# Patient Record
Sex: FEMALE | Race: WHITE
[De-identification: names, ages, dates, MRNs, and addresses within clinical notes are randomized per-mention and may not be internally consistent; named-entity substitution may affect disease eponyms.]

---

## 2017-10-02 ENCOUNTER — HOSPITAL ENCOUNTER (OUTPATIENT)
Dept: HOSPITAL 53 - M WUC | Age: 42
End: 2017-10-02
Attending: PHYSICIAN ASSISTANT
Payer: COMMERCIAL

## 2017-10-02 DIAGNOSIS — R51: Primary | ICD-10-CM

## 2017-10-02 LAB
ADD MANUAL DIFFER: YES
ALBUMIN SERPL BCG-MCNC: 4.3 GM/DL (ref 3.2–5.2)
ALBUMIN/GLOB SERPL: 1.13 {RATIO} (ref 1–1.93)
ALP SERPL-CCNC: 115 U/L (ref 45–117)
ALT SERPL W P-5'-P-CCNC: 15 U/L (ref 12–78)
ANION GAP SERPL CALC-SCNC: 8 MEQ/L (ref 8–16)
ANISOCYTOSIS BLD QL SMEAR: (no result)
AST SERPL-CCNC: 9 U/L (ref 15–37)
BASOPHILS NFR BLD MANUAL: 1 % (ref 0–4)
BILIRUB SERPL-MCNC: 0.9 MG/DL (ref 0.2–1)
BUN SERPL-MCNC: 13 MG/DL (ref 7–18)
CALCIUM SERPL-MCNC: 9.5 MG/DL (ref 8.5–10.1)
CHLORIDE SERPL-SCNC: 105 MEQ/L (ref 98–107)
CO2 SERPL-SCNC: 28 MEQ/L (ref 21–32)
CREAT SERPL-MCNC: 0.6 MG/DL (ref 0.55–1.02)
DIFF SLIDE NUMBER: 254
ERYTHROCYTE [DISTWIDTH] IN BLOOD BY AUTOMATED COUNT: 17.1 % (ref 11.5–14.5)
ERYTHROCYTE [SEDIMENTATION RATE] IN BLOOD BY WESTERGREN METHOD: 12 MM/HR (ref 0–20)
GFR SERPL CREATININE-BSD FRML MDRD: > 60 ML/MIN/{1.73_M2} (ref 58–?)
GLUCOSE SERPL-MCNC: 92 MG/DL (ref 70–105)
MCH RBC QN AUTO: 28.2 PG (ref 27–33)
MCHC RBC AUTO-ENTMCNC: 32.1 G/DL (ref 32–36.5)
MCV RBC AUTO: 87.8 FL (ref 80–96)
NRBC BLD AUTO-RTO: 0 % (ref 0–0)
PLATELET # BLD AUTO: 290 10^3/UL (ref 150–450)
POTASSIUM SERPL-SCNC: 4.1 MEQ/L (ref 3.5–5.1)
PROT SERPL-MCNC: 8.1 GM/DL (ref 6.4–8.2)
SODIUM SERPL-SCNC: 141 MEQ/L (ref 136–145)
WBC # BLD AUTO: 12 10^3/UL (ref 4–10)

## 2017-10-03 ENCOUNTER — HOSPITAL ENCOUNTER (INPATIENT)
Dept: HOSPITAL 53 - M ED | Age: 42
LOS: 10 days | Discharge: HOME | DRG: 21 | End: 2017-10-13
Attending: NEUROLOGICAL SURGERY | Admitting: INTERNAL MEDICINE
Payer: COMMERCIAL

## 2017-10-03 VITALS — SYSTOLIC BLOOD PRESSURE: 136 MMHG | DIASTOLIC BLOOD PRESSURE: 65 MMHG

## 2017-10-03 VITALS — SYSTOLIC BLOOD PRESSURE: 132 MMHG | DIASTOLIC BLOOD PRESSURE: 67 MMHG

## 2017-10-03 VITALS — DIASTOLIC BLOOD PRESSURE: 66 MMHG | SYSTOLIC BLOOD PRESSURE: 133 MMHG

## 2017-10-03 VITALS — SYSTOLIC BLOOD PRESSURE: 141 MMHG | DIASTOLIC BLOOD PRESSURE: 68 MMHG

## 2017-10-03 VITALS — WEIGHT: 139.99 LBS | BODY MASS INDEX: 22.5 KG/M2 | HEIGHT: 66 IN

## 2017-10-03 VITALS — DIASTOLIC BLOOD PRESSURE: 71 MMHG | SYSTOLIC BLOOD PRESSURE: 133 MMHG

## 2017-10-03 VITALS — DIASTOLIC BLOOD PRESSURE: 70 MMHG | SYSTOLIC BLOOD PRESSURE: 146 MMHG

## 2017-10-03 DIAGNOSIS — I16.9: ICD-10-CM

## 2017-10-03 DIAGNOSIS — G04.90: ICD-10-CM

## 2017-10-03 DIAGNOSIS — F17.210: ICD-10-CM

## 2017-10-03 DIAGNOSIS — I61.5: Primary | ICD-10-CM

## 2017-10-03 DIAGNOSIS — G91.3: ICD-10-CM

## 2017-10-03 LAB
ADD MANUAL DIFFER: NO
ALBUMIN SERPL BCG-MCNC: 4.2 GM/DL (ref 3.2–5.2)
ALBUMIN/GLOB SERPL: 1.14 {RATIO} (ref 1–1.93)
ALP SERPL-CCNC: 104 U/L (ref 45–117)
ALT SERPL W P-5'-P-CCNC: 12 U/L (ref 12–78)
ANION GAP SERPL CALC-SCNC: 7 MEQ/L (ref 8–16)
AST SERPL-CCNC: 8 U/L (ref 15–37)
BASOPHILS # BLD AUTO: 0.1 10^3/UL (ref 0–0.2)
BASOPHILS NFR BLD AUTO: 0.6 % (ref 0–1)
BILIRUB CONJ SERPL-MCNC: 0.2 MG/DL (ref 0–0.2)
BILIRUB SERPL-MCNC: 0.7 MG/DL (ref 0.2–1)
BUN SERPL-MCNC: 20 MG/DL (ref 7–18)
CALCIUM SERPL-MCNC: 9.3 MG/DL (ref 8.5–10.1)
CHLORIDE SERPL-SCNC: 105 MEQ/L (ref 98–107)
CO2 SERPL-SCNC: 28 MEQ/L (ref 21–32)
CREAT SERPL-MCNC: 0.62 MG/DL (ref 0.55–1.02)
DIFF SLIDE NUMBER: 249
EOSINOPHIL # BLD AUTO: 0.1 10^3/UL (ref 0–0.5)
EOSINOPHIL NFR BLD AUTO: 0.6 % (ref 0–3)
ERYTHROCYTE [DISTWIDTH] IN BLOOD BY AUTOMATED COUNT: 17.2 % (ref 11.5–14.5)
GFR SERPL CREATININE-BSD FRML MDRD: > 60 ML/MIN/{1.73_M2} (ref 58–?)
GLUCOSE SERPL-MCNC: 85 MG/DL (ref 70–105)
IMM GRANULOCYTES NFR BLD: 0.4 % (ref 0–0)
INR PPP: 1.04
LYMPHOCYTES # BLD AUTO: 1.5 10^3/UL (ref 1.5–4.5)
LYMPHOCYTES NFR BLD AUTO: 13.9 % (ref 24–44)
MCH RBC QN AUTO: 27.9 PG (ref 27–33)
MCHC RBC AUTO-ENTMCNC: 31.6 G/DL (ref 32–36.5)
MCV RBC AUTO: 88.4 FL (ref 80–96)
MONOCYTES # BLD AUTO: 0.6 10^3/UL (ref 0–0.8)
MONOCYTES NFR BLD AUTO: 5.2 % (ref 0–5)
NEUTROPHILS # BLD AUTO: 8.6 10^3/UL (ref 1.8–7.7)
NEUTROPHILS NFR BLD AUTO: 79.3 % (ref 36–66)
NRBC BLD AUTO-RTO: 0 % (ref 0–0)
PLATELET # BLD AUTO: 282 10^3/UL (ref 150–450)
POTASSIUM SERPL-SCNC: 3.6 MEQ/L (ref 3.5–5.1)
PROT SERPL-MCNC: 7.9 GM/DL (ref 6.4–8.2)
SODIUM SERPL-SCNC: 140 MEQ/L (ref 136–145)
WBC # BLD AUTO: 10.9 10^3/UL (ref 4–10)

## 2017-10-03 RX ADMIN — HYDROCODONE BITARTRATE AND ACETAMINOPHEN PRN TAB: 5; 325 TABLET ORAL at 17:18

## 2017-10-03 RX ADMIN — MORPHINE SULFATE PRN MG: 2 INJECTION, SOLUTION INTRAMUSCULAR; INTRAVENOUS at 22:25

## 2017-10-03 RX ADMIN — MORPHINE SULFATE PRN MG: 4 INJECTION INTRAVENOUS at 15:18

## 2017-10-03 RX ADMIN — NICARDIPINE HYDROCHLORIDE SCH MLS/HR: 0.2 INJECTION, SOLUTION INTRAVENOUS at 17:35

## 2017-10-03 RX ADMIN — MORPHINE SULFATE PRN MG: 4 INJECTION INTRAVENOUS at 16:23

## 2017-10-03 RX ADMIN — MORPHINE SULFATE PRN MG: 4 INJECTION INTRAVENOUS at 14:07

## 2017-10-03 RX ADMIN — ONDANSETRON PRN MG: 2 INJECTION INTRAMUSCULAR; INTRAVENOUS at 22:18

## 2017-10-03 RX ADMIN — MORPHINE SULFATE PRN MG: 4 INJECTION INTRAVENOUS at 19:19

## 2017-10-03 NOTE — REP
CT of the brain without IV contrast:

 

There are no comparisons.

 

There is an acute intraparenchymal hemorrhage in the right basal ganglia.  This

has dissected into the intracranial ventricles and hemorrhage  and be seen in the

lateral ventricles and third and fourth ventricles.

 

The cortical stripe is unremarkable.  There is no  mass effect or midline shift.

 

The visualized paranasal sinuses and mastoid air cells are clear.

 

Impression:

 

Acute intraparenchymal right basal ganglia hemorrhage that has dissected into the

intracranial ventricles.

 

 

Signed by

Herb Monsivais MD 10/03/2017 01:20 P

## 2017-10-03 NOTE — REP
CT ANGIO HEAD:

 

HISTORY:  Intracranial hemorrhage.

 

CONTRAST: Isovue 370, 75 mL.

 

There is no aneurysm, arteriovenous malformation, or atherosclerotic lesion.

Major intracranial vessels are patent.  The vertebral arteries are equal in size.

 

 

IMPRESSION:

 

Normal CT ANGIO head.

 

 

Signed by

Franc Way MD 10/03/2017 03:11 P

## 2017-10-03 NOTE — HPE
DATE OF ADMISSION:  10/03/2017

 

I was asked to emergently evaluate Ms. Damon for intracranial hemorrhage with

associated hypertensive crisis.

 

HISTORY OF PRESENT ILLNESS:   Ms. Damon is a 41-year-old white female with no

significant past medical history who developed a headache on Sunday morning.  
The

headache has continued to progress.  Starting Sunday afternoon, she had nausea

and emesis and this has also persisted.  She was seen at urgent care yesterday

and was hypertensive with a blood pressure in the 180s over the 100s.  I do not

know what treatment was she was given at that time.  When her symptoms persisted
,

she proceeded to the emergency department today where she was again found to be

hypertensive with a presenting blood pressure of 194/106 with a MAP of 135, 
which

was also her maximum MAP.  Her evaluation included head CT, which showed an

intracranial hemorrhage.  The CT angiogram was normal.  Ms. Damon was started 
on

Cardizem drip and her blood pressure has decreased into an acceptable range.

 

Ms. Damon reports no history of hypertension.  While she has not been seen a

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

frequently take each other's blood pressures and she reports her blood pressure

has always been low.  She continues to have a headache and notes shooting pain

when she moves her head.  She notes photophobia since Sunday but not previously.

No visual changes. No shortness of breath or chest pain.  No palpitations.  She

has felt hot and cold and has had non-drenching sweats, which is new for her.  
No

fevers.  No ill contacts.  As noted previously, she has nausea and emesis, which

started on Sunday.  No wheezing.  No lower extremity edema.  No history of deep

vein thrombosis (DVT).  No trauma.

 

She is not on any medications at baseline.

 

PAST MEDICAL HISTORY:  Tobacco usage, ongoing.

 

MEDICATIONS:  None.

 

ALLERGIES:  No known drug allergies.

 

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  She has not been out of the

area recently and is currently working in [employer].  She has a new puppy that is a

pit bull/[place] bulldog mix and a rabbit as pets.  She is a smoker, having

started at age 17 and averages one half to one pack per day.  She may have two

alcoholic beverages per week.  No recreational drug usage.

 

FAMILY HISTORY:   There is a family history on both sides of hypertension.

 

REVIEW OF SYSTEMS:  As per history of present illness.  The remainder of the

pertinent review of systems are negative.

 

PHYSICAL EXAMINATION:

GENERAL:  Ms. Damon is lying in bed flat on her back in a dark room and appears

reasonably comfortable.

VITAL SIGNS:  Temperature 98.2, pulse respiratory rate 18, blood pressure 124/56

with a MAP of 78, SpO2 of 96 %on room air.  Pulse 88.

HEENT:  Anicteric.  Pupils are 1 to 2 mm and reactive, anicteric.  Nares:  
Patent

bilaterally with moist mucosa.  Oropharynx clear with no lesions.  No evidence 
of

drainage in the posterior oropharynx.  Moist mucosa. Good dentition.  Without

thyromegaly or masses.  Trachea is midline.

LYMPHS:  Without cervical or supraclavicular lymphadenopathy.

LUNGS:  Symmetric excursion.  Good air entry. No wheeze, rhonchi or crackles on

tidal excursion.  Normal I:E.  No accessory muscle usage or retractions.  Normal

percussion and palpation.

CARDIOVASCULAR:  Regular rate and rhythm with normal S1, S2.  No murmur, rub or

gallop appreciated.

ABDOMEN:  Normoactive bowel sounds. Soft, nondistended, nontender.  No

hepatosplenomegaly or masses appreciated.

EXTREMITIES:  Without clubbing, cyanosis, or edema.

 

LABORATORY DATA:

Complete blood count (CBC) today showed a hemoglobin of 12, hematocrit 38,

platelet count 282,000.  White blood cell count 10,900 with a differential of 79
%

neutrophils, 14% lymphocytes, 5% monocytes.

 

Chemistries show a sodium of 140, potassium 3.6, chloride 105, bicarbonate 28,

anion gap 7, BUN 20, creatinine 0.6, glucose 85, calcium 9.3, total bilirubin

0.7, direct bilirubin 0.2, AST 8, ALT 12, alkaline phosphatase 104, CK 56, CK-MB

1.0, troponin I less than 0.02, total protein 7.9, albumin 4.2.  INR 1.04 and 
PTT

23.5.  I reviewed her head CT scan, as well as the report from earlier today.

That study showed acute intraparenchymal basal ganglia hemorrhage that has

dissected into the intracranial ventricles, per the report, and I agree with

the findings in the report.  Her CT angio of the head was read was normal.

 

IMPRESSION:

1.  Intracranial hemorrhage, likely secondary to hypertension.

2.  Hypertensive crisis.

3.  Tobacco usage, ongoing.

 

RECOMMENDATIONS:

1.  We will continue the Cardizem drip.

2.  Start Norvasc.

3.  While she does not have the classic triad of a pheochromocytoma, we will do 
at least a

screening 24 hour urine collection for catecholamines and metanephrines.

4.  We will consult neurosurgery.

5.  Anticipate at least 2 days of monitoring in the intensive care unit (ICU) to

make sure that there is no evidence of hydrocephalus.

 

Critical care time 50 minutes, not including procedures.

RUMA

## 2017-10-04 VITALS — SYSTOLIC BLOOD PRESSURE: 138 MMHG | DIASTOLIC BLOOD PRESSURE: 66 MMHG

## 2017-10-04 VITALS — DIASTOLIC BLOOD PRESSURE: 58 MMHG | SYSTOLIC BLOOD PRESSURE: 122 MMHG

## 2017-10-04 VITALS — DIASTOLIC BLOOD PRESSURE: 64 MMHG | SYSTOLIC BLOOD PRESSURE: 152 MMHG

## 2017-10-04 VITALS — SYSTOLIC BLOOD PRESSURE: 136 MMHG | DIASTOLIC BLOOD PRESSURE: 64 MMHG

## 2017-10-04 VITALS — DIASTOLIC BLOOD PRESSURE: 67 MMHG | SYSTOLIC BLOOD PRESSURE: 144 MMHG

## 2017-10-04 VITALS — SYSTOLIC BLOOD PRESSURE: 125 MMHG | DIASTOLIC BLOOD PRESSURE: 66 MMHG

## 2017-10-04 VITALS — DIASTOLIC BLOOD PRESSURE: 74 MMHG | SYSTOLIC BLOOD PRESSURE: 152 MMHG

## 2017-10-04 VITALS — SYSTOLIC BLOOD PRESSURE: 139 MMHG | DIASTOLIC BLOOD PRESSURE: 63 MMHG

## 2017-10-04 VITALS — SYSTOLIC BLOOD PRESSURE: 128 MMHG | DIASTOLIC BLOOD PRESSURE: 62 MMHG

## 2017-10-04 VITALS — SYSTOLIC BLOOD PRESSURE: 141 MMHG | DIASTOLIC BLOOD PRESSURE: 63 MMHG

## 2017-10-04 VITALS — SYSTOLIC BLOOD PRESSURE: 118 MMHG | DIASTOLIC BLOOD PRESSURE: 57 MMHG

## 2017-10-04 VITALS — SYSTOLIC BLOOD PRESSURE: 128 MMHG | DIASTOLIC BLOOD PRESSURE: 65 MMHG

## 2017-10-04 VITALS — SYSTOLIC BLOOD PRESSURE: 134 MMHG | DIASTOLIC BLOOD PRESSURE: 63 MMHG

## 2017-10-04 VITALS — DIASTOLIC BLOOD PRESSURE: 57 MMHG | SYSTOLIC BLOOD PRESSURE: 136 MMHG

## 2017-10-04 VITALS — SYSTOLIC BLOOD PRESSURE: 157 MMHG | DIASTOLIC BLOOD PRESSURE: 81 MMHG

## 2017-10-04 VITALS — SYSTOLIC BLOOD PRESSURE: 151 MMHG | DIASTOLIC BLOOD PRESSURE: 80 MMHG

## 2017-10-04 VITALS — SYSTOLIC BLOOD PRESSURE: 139 MMHG | DIASTOLIC BLOOD PRESSURE: 68 MMHG

## 2017-10-04 VITALS — DIASTOLIC BLOOD PRESSURE: 59 MMHG | SYSTOLIC BLOOD PRESSURE: 137 MMHG

## 2017-10-04 VITALS — DIASTOLIC BLOOD PRESSURE: 87 MMHG | SYSTOLIC BLOOD PRESSURE: 134 MMHG

## 2017-10-04 VITALS — SYSTOLIC BLOOD PRESSURE: 139 MMHG | DIASTOLIC BLOOD PRESSURE: 66 MMHG

## 2017-10-04 VITALS — DIASTOLIC BLOOD PRESSURE: 58 MMHG | SYSTOLIC BLOOD PRESSURE: 130 MMHG

## 2017-10-04 VITALS — DIASTOLIC BLOOD PRESSURE: 65 MMHG | SYSTOLIC BLOOD PRESSURE: 137 MMHG

## 2017-10-04 VITALS — DIASTOLIC BLOOD PRESSURE: 63 MMHG | SYSTOLIC BLOOD PRESSURE: 130 MMHG

## 2017-10-04 VITALS — DIASTOLIC BLOOD PRESSURE: 73 MMHG | SYSTOLIC BLOOD PRESSURE: 134 MMHG

## 2017-10-04 LAB
ALBUMIN SERPL BCG-MCNC: 3.7 GM/DL (ref 3.2–5.2)
ALBUMIN/GLOB SERPL: 1.06 {RATIO} (ref 1–1.93)
ALP SERPL-CCNC: 90 U/L (ref 45–117)
ALT SERPL W P-5'-P-CCNC: 12 U/L (ref 12–78)
ANION GAP SERPL CALC-SCNC: 11 MEQ/L (ref 8–16)
AST SERPL-CCNC: 7 U/L (ref 15–37)
BILIRUB SERPL-MCNC: 0.7 MG/DL (ref 0.2–1)
BUN SERPL-MCNC: 13 MG/DL (ref 7–18)
CALCIUM SERPL-MCNC: 8.6 MG/DL (ref 8.5–10.1)
CHLORIDE SERPL-SCNC: 108 MEQ/L (ref 98–107)
CO2 SERPL-SCNC: 22 MEQ/L (ref 21–32)
CREAT SERPL-MCNC: 0.41 MG/DL (ref 0.55–1.02)
ERYTHROCYTE [DISTWIDTH] IN BLOOD BY AUTOMATED COUNT: 17.1 % (ref 11.5–14.5)
GFR SERPL CREATININE-BSD FRML MDRD: > 60 ML/MIN/{1.73_M2} (ref 58–?)
GLUCOSE SERPL-MCNC: 95 MG/DL (ref 70–105)
MCH RBC QN AUTO: 27.4 PG (ref 27–33)
MCHC RBC AUTO-ENTMCNC: 31.1 G/DL (ref 32–36.5)
MCV RBC AUTO: 88.1 FL (ref 80–96)
PLATELET # BLD AUTO: 258 10^3/UL (ref 150–450)
POTASSIUM SERPL-SCNC: 3.3 MEQ/L (ref 3.5–5.1)
PROT SERPL-MCNC: 7.2 GM/DL (ref 6.4–8.2)
SODIUM SERPL-SCNC: 141 MEQ/L (ref 136–145)
WBC # BLD AUTO: 11.1 10^3/UL (ref 4–10)

## 2017-10-04 RX ADMIN — MORPHINE SULFATE PRN MG: 2 INJECTION, SOLUTION INTRAMUSCULAR; INTRAVENOUS at 15:52

## 2017-10-04 RX ADMIN — NICARDIPINE HYDROCHLORIDE SCH MLS/HR: 0.2 INJECTION, SOLUTION INTRAVENOUS at 15:34

## 2017-10-04 RX ADMIN — MORPHINE SULFATE PRN MG: 2 INJECTION, SOLUTION INTRAMUSCULAR; INTRAVENOUS at 21:45

## 2017-10-04 RX ADMIN — MORPHINE SULFATE PRN MG: 2 INJECTION, SOLUTION INTRAMUSCULAR; INTRAVENOUS at 04:36

## 2017-10-04 RX ADMIN — HYDROCODONE BITARTRATE AND ACETAMINOPHEN PRN TAB: 5; 325 TABLET ORAL at 11:59

## 2017-10-04 RX ADMIN — MORPHINE SULFATE PRN MG: 2 INJECTION, SOLUTION INTRAMUSCULAR; INTRAVENOUS at 11:06

## 2017-10-04 RX ADMIN — METOCLOPRAMIDE PRN MG: 5 INJECTION, SOLUTION INTRAMUSCULAR; INTRAVENOUS at 17:46

## 2017-10-04 RX ADMIN — DOCUSATE SODIUM SCH MG: 100 CAPSULE, LIQUID FILLED ORAL at 20:03

## 2017-10-04 RX ADMIN — MORPHINE SULFATE PRN MG: 2 INJECTION, SOLUTION INTRAMUSCULAR; INTRAVENOUS at 17:54

## 2017-10-04 RX ADMIN — HYDROCODONE BITARTRATE AND ACETAMINOPHEN PRN TAB: 5; 325 TABLET ORAL at 07:58

## 2017-10-04 RX ADMIN — POTASSIUM CHLORIDE AND SODIUM CHLORIDE SCH MLS/HR: 900; 150 INJECTION, SOLUTION INTRAVENOUS at 23:35

## 2017-10-04 RX ADMIN — ONDANSETRON PRN MG: 2 INJECTION INTRAMUSCULAR; INTRAVENOUS at 02:35

## 2017-10-04 RX ADMIN — MORPHINE SULFATE PRN MG: 2 INJECTION, SOLUTION INTRAMUSCULAR; INTRAVENOUS at 02:35

## 2017-10-04 RX ADMIN — ONDANSETRON PRN MG: 2 INJECTION INTRAMUSCULAR; INTRAVENOUS at 19:45

## 2017-10-04 RX ADMIN — MORPHINE SULFATE PRN MG: 2 INJECTION, SOLUTION INTRAMUSCULAR; INTRAVENOUS at 06:36

## 2017-10-04 RX ADMIN — MORPHINE SULFATE PRN MG: 2 INJECTION, SOLUTION INTRAMUSCULAR; INTRAVENOUS at 00:28

## 2017-10-04 RX ADMIN — NICARDIPINE HYDROCHLORIDE SCH MLS/HR: 0.2 INJECTION, SOLUTION INTRAVENOUS at 08:00

## 2017-10-04 RX ADMIN — MORPHINE SULFATE PRN MG: 2 INJECTION, SOLUTION INTRAMUSCULAR; INTRAVENOUS at 19:46

## 2017-10-04 RX ADMIN — MORPHINE SULFATE PRN MG: 2 INJECTION, SOLUTION INTRAMUSCULAR; INTRAVENOUS at 13:50

## 2017-10-04 RX ADMIN — METOCLOPRAMIDE PRN MG: 5 INJECTION, SOLUTION INTRAMUSCULAR; INTRAVENOUS at 10:56

## 2017-10-04 RX ADMIN — NICARDIPINE HYDROCHLORIDE SCH MLS/HR: 0.2 INJECTION, SOLUTION INTRAVENOUS at 22:26

## 2017-10-04 RX ADMIN — ONDANSETRON PRN MG: 2 INJECTION INTRAMUSCULAR; INTRAVENOUS at 13:50

## 2017-10-04 RX ADMIN — MORPHINE SULFATE PRN MG: 2 INJECTION, SOLUTION INTRAMUSCULAR; INTRAVENOUS at 08:34

## 2017-10-04 RX ADMIN — ACETAMINOPHEN PRN MG: 325 TABLET ORAL at 04:33

## 2017-10-04 RX ADMIN — DEXTROSE MONOHYDRATE SCH MG: 50 INJECTION, SOLUTION INTRAVENOUS at 12:00

## 2017-10-04 RX ADMIN — NICARDIPINE HYDROCHLORIDE SCH MLS/HR: 0.2 INJECTION, SOLUTION INTRAVENOUS at 19:50

## 2017-10-04 RX ADMIN — DOCUSATE SODIUM SCH MG: 100 CAPSULE, LIQUID FILLED ORAL at 10:44

## 2017-10-04 RX ADMIN — ONDANSETRON PRN MG: 2 INJECTION INTRAMUSCULAR; INTRAVENOUS at 06:35

## 2017-10-04 RX ADMIN — POTASSIUM CHLORIDE AND SODIUM CHLORIDE SCH MLS/HR: 900; 150 INJECTION, SOLUTION INTRAVENOUS at 10:44

## 2017-10-04 RX ADMIN — NICARDIPINE HYDROCHLORIDE SCH MLS/HR: 0.2 INJECTION, SOLUTION INTRAVENOUS at 00:29

## 2017-10-04 NOTE — REP
CT Head without contrast

 

HISTORY:  Intracranial hemorrhage

 

COMPARISON: 10/03/2017

 

A small acute intraparenchymal hematoma is present in the right basal ganglia.

This is unchanged compared to the previous study. A small amount of

intraventricular hemorrhage is present unchanged compared to the previous study.

The ventricles are slightly increased in size compared to the previous study.

There is no acute infarct,  mass or midline shift. There is no extra cerebral

collection.  There is no fracture.  The visualized sinuses are clear.

 

IMPRESSION:

1.  Small acute right basal ganglia intraparenchymal hematoma unchanged compared

to the previous study.

2.  Small amount of intraventricular hemorrhage unchanged compared to the

previous study. There has been a slight increase in size of the ventricles.

 

 

 

Signed by

Franc Way MD 10/04/2017 09:01 A

## 2017-10-04 NOTE — ECGEPIP
Stationary ECG Study

                           [facility] - ED

                                       

                                       Test Date:    2017-10-03

Pat Name:     NEELA NARVAEZ         Department:   

Patient ID:   [id number]                 Room:         -

Gender:       F                        Technician:   cindy

:          1975               Requested By: JESSICA HENDERSON PA-C

Order Number: [id number]     Reading MD:   Endy Campa

                                 Measurements

Intervals                              Axis          

Rate:         122                      P:            15

ME:           133                      QRS:          56

QRSD:         85                       T:            36

QT:           209                                    

QTc:          298                                    

                           Interpretive Statements

SINUS TACHYCARDIA WITH FREQUENT VENTRICULAR PREMATURE COMPLEXES

NONSPECIFIC T-WAVE ABNORMALITY

BENIGN EARLY REPOLARIZATION

NO PRIORS

 

Electronically Signed On 10-4-2017 6:21:38 EDT by Endy Campa

## 2017-10-05 VITALS — SYSTOLIC BLOOD PRESSURE: 132 MMHG | DIASTOLIC BLOOD PRESSURE: 68 MMHG

## 2017-10-05 VITALS — DIASTOLIC BLOOD PRESSURE: 74 MMHG | SYSTOLIC BLOOD PRESSURE: 145 MMHG

## 2017-10-05 VITALS — SYSTOLIC BLOOD PRESSURE: 131 MMHG | DIASTOLIC BLOOD PRESSURE: 60 MMHG

## 2017-10-05 VITALS — SYSTOLIC BLOOD PRESSURE: 119 MMHG | DIASTOLIC BLOOD PRESSURE: 58 MMHG

## 2017-10-05 VITALS — SYSTOLIC BLOOD PRESSURE: 140 MMHG | DIASTOLIC BLOOD PRESSURE: 70 MMHG | HEART RATE: 90 BPM

## 2017-10-05 VITALS — DIASTOLIC BLOOD PRESSURE: 65 MMHG | SYSTOLIC BLOOD PRESSURE: 142 MMHG

## 2017-10-05 VITALS — DIASTOLIC BLOOD PRESSURE: 74 MMHG | SYSTOLIC BLOOD PRESSURE: 141 MMHG

## 2017-10-05 VITALS — SYSTOLIC BLOOD PRESSURE: 131 MMHG | DIASTOLIC BLOOD PRESSURE: 72 MMHG

## 2017-10-05 VITALS — DIASTOLIC BLOOD PRESSURE: 86 MMHG | SYSTOLIC BLOOD PRESSURE: 146 MMHG

## 2017-10-05 VITALS — DIASTOLIC BLOOD PRESSURE: 80 MMHG | SYSTOLIC BLOOD PRESSURE: 149 MMHG

## 2017-10-05 VITALS — SYSTOLIC BLOOD PRESSURE: 137 MMHG | DIASTOLIC BLOOD PRESSURE: 64 MMHG

## 2017-10-05 VITALS — DIASTOLIC BLOOD PRESSURE: 54 MMHG | SYSTOLIC BLOOD PRESSURE: 117 MMHG

## 2017-10-05 VITALS — SYSTOLIC BLOOD PRESSURE: 148 MMHG | DIASTOLIC BLOOD PRESSURE: 75 MMHG

## 2017-10-05 VITALS — DIASTOLIC BLOOD PRESSURE: 71 MMHG | SYSTOLIC BLOOD PRESSURE: 137 MMHG

## 2017-10-05 VITALS — DIASTOLIC BLOOD PRESSURE: 78 MMHG | SYSTOLIC BLOOD PRESSURE: 154 MMHG

## 2017-10-05 VITALS — SYSTOLIC BLOOD PRESSURE: 121 MMHG | DIASTOLIC BLOOD PRESSURE: 54 MMHG

## 2017-10-05 LAB
ALBUMIN SERPL BCG-MCNC: 3.5 GM/DL (ref 3.2–5.2)
ALBUMIN/GLOB SERPL: 1.06 {RATIO} (ref 1–1.93)
ALP SERPL-CCNC: 81 U/L (ref 45–117)
ALT SERPL W P-5'-P-CCNC: 11 U/L (ref 12–78)
ANION GAP SERPL CALC-SCNC: 12 MEQ/L (ref 8–16)
APPEARANCE CSF: (no result)
AST SERPL-CCNC: 6 U/L (ref 15–37)
BILIRUB SERPL-MCNC: 0.8 MG/DL (ref 0.2–1)
BUN SERPL-MCNC: 8 MG/DL (ref 7–18)
CALCIUM SERPL-MCNC: 8.5 MG/DL (ref 8.5–10.1)
CHLORIDE SERPL-SCNC: 109 MEQ/L (ref 98–107)
CO2 SERPL-SCNC: 21 MEQ/L (ref 21–32)
COLOR CSF: (no result)
CREAT SERPL-MCNC: 0.47 MG/DL (ref 0.55–1.02)
CSF DIFF IF INDICATED?: YES
CSF MONONUCLEAR CELL %: 58.6 % (ref 0–0)
CSF POLYMORPHONUCLEAR CELL %: 41.4 (ref 0–0)
ERYTHROCYTE [DISTWIDTH] IN BLOOD BY AUTOMATED COUNT: 17.1 % (ref 11.5–14.5)
GFR SERPL CREATININE-BSD FRML MDRD: > 60 ML/MIN/{1.73_M2} (ref 58–?)
GLUCOSE CSF-MCNC: 80 MG/DL (ref 40–75)
GLUCOSE SERPL-MCNC: 97 MG/DL (ref 70–105)
MCH RBC QN AUTO: 28.3 PG (ref 27–33)
MCHC RBC AUTO-ENTMCNC: 32.3 G/DL (ref 32–36.5)
MCV RBC AUTO: 87.6 FL (ref 80–96)
PLATELET # BLD AUTO: 246 10^3/UL (ref 150–450)
POTASSIUM SERPL-SCNC: 3.4 MEQ/L (ref 3.5–5.1)
PROT SERPL-MCNC: 6.8 GM/DL (ref 6.4–8.2)
SODIUM SERPL-SCNC: 142 MEQ/L (ref 136–145)
TUBE # CSF: (no result)
WBC # BLD AUTO: 10.8 10^3/UL (ref 4–10)

## 2017-10-05 PROCEDURE — 009600Z DRAINAGE OF CEREBRAL VENTRICLE WITH DRAINAGE DEVICE, OPEN APPROACH: ICD-10-PCS | Performed by: NEUROLOGICAL SURGERY

## 2017-10-05 RX ADMIN — DEXTROSE MONOHYDRATE SCH MG: 50 INJECTION, SOLUTION INTRAVENOUS at 08:41

## 2017-10-05 RX ADMIN — NICARDIPINE HYDROCHLORIDE SCH MLS/HR: 0.2 INJECTION, SOLUTION INTRAVENOUS at 12:16

## 2017-10-05 RX ADMIN — MORPHINE SULFATE PRN MG: 2 INJECTION, SOLUTION INTRAMUSCULAR; INTRAVENOUS at 04:46

## 2017-10-05 RX ADMIN — MORPHINE SULFATE PRN MG: 2 INJECTION, SOLUTION INTRAMUSCULAR; INTRAVENOUS at 15:57

## 2017-10-05 RX ADMIN — NICARDIPINE HYDROCHLORIDE SCH MLS/HR: 0.2 INJECTION, SOLUTION INTRAVENOUS at 18:10

## 2017-10-05 RX ADMIN — POTASSIUM CHLORIDE AND SODIUM CHLORIDE SCH MLS/HR: 900; 150 INJECTION, SOLUTION INTRAVENOUS at 20:42

## 2017-10-05 RX ADMIN — HYDROCODONE BITARTRATE AND ACETAMINOPHEN PRN TAB: 5; 325 TABLET ORAL at 20:42

## 2017-10-05 RX ADMIN — HYDROCODONE BITARTRATE AND ACETAMINOPHEN PRN TAB: 5; 325 TABLET ORAL at 16:42

## 2017-10-05 RX ADMIN — SODIUM CHLORIDE, PRESERVATIVE FREE SCH ML: 5 INJECTION INTRAVENOUS at 22:00

## 2017-10-05 RX ADMIN — MORPHINE SULFATE PRN MG: 2 INJECTION, SOLUTION INTRAMUSCULAR; INTRAVENOUS at 18:12

## 2017-10-05 RX ADMIN — ONDANSETRON PRN MG: 2 INJECTION INTRAMUSCULAR; INTRAVENOUS at 07:36

## 2017-10-05 RX ADMIN — MORPHINE SULFATE PRN MG: 2 INJECTION, SOLUTION INTRAMUSCULAR; INTRAVENOUS at 11:08

## 2017-10-05 RX ADMIN — MORPHINE SULFATE PRN MG: 2 INJECTION, SOLUTION INTRAMUSCULAR; INTRAVENOUS at 06:33

## 2017-10-05 RX ADMIN — MORPHINE SULFATE PRN MG: 2 INJECTION, SOLUTION INTRAMUSCULAR; INTRAVENOUS at 13:43

## 2017-10-05 RX ADMIN — METOCLOPRAMIDE PRN MG: 5 INJECTION, SOLUTION INTRAMUSCULAR; INTRAVENOUS at 11:07

## 2017-10-05 RX ADMIN — ONDANSETRON PRN MG: 2 INJECTION INTRAMUSCULAR; INTRAVENOUS at 21:09

## 2017-10-05 RX ADMIN — MORPHINE SULFATE PRN MG: 2 INJECTION, SOLUTION INTRAMUSCULAR; INTRAVENOUS at 21:10

## 2017-10-05 RX ADMIN — ONDANSETRON PRN MG: 2 INJECTION INTRAMUSCULAR; INTRAVENOUS at 15:57

## 2017-10-05 RX ADMIN — MORPHINE SULFATE PRN MG: 2 INJECTION, SOLUTION INTRAMUSCULAR; INTRAVENOUS at 08:55

## 2017-10-05 RX ADMIN — POTASSIUM CHLORIDE SCH MLS/HR: 7.46 INJECTION, SOLUTION INTRAVENOUS at 11:10

## 2017-10-05 RX ADMIN — POTASSIUM CHLORIDE SCH MLS/HR: 7.46 INJECTION, SOLUTION INTRAVENOUS at 08:41

## 2017-10-05 RX ADMIN — NICARDIPINE HYDROCHLORIDE SCH MLS/HR: 0.2 INJECTION, SOLUTION INTRAVENOUS at 04:14

## 2017-10-05 RX ADMIN — HYDROCODONE BITARTRATE AND ACETAMINOPHEN PRN TAB: 5; 325 TABLET ORAL at 11:57

## 2017-10-05 RX ADMIN — METOCLOPRAMIDE PRN MG: 5 INJECTION, SOLUTION INTRAMUSCULAR; INTRAVENOUS at 03:26

## 2017-10-05 RX ADMIN — HYDROCODONE BITARTRATE AND ACETAMINOPHEN PRN TAB: 5; 325 TABLET ORAL at 07:38

## 2017-10-05 RX ADMIN — MORPHINE SULFATE PRN MG: 2 INJECTION, SOLUTION INTRAMUSCULAR; INTRAVENOUS at 02:31

## 2017-10-05 NOTE — ROOPDOC
[facility] Report Of Operation


Report of Operation


DATE OF SURGERY: 10/5/2017


SURGEON: Dr. Mary Alice Han


ASSISTANT: None


PREOPERATIVE DIAGNOSIS: Acute Post-hemorrhagic Hydrocephalus


POSTOPERATIVE DIAGNOSIS: Same


PROCEDURE PERFORMED:


1. Left  frontal heather hole placement.


2 Placement of ventricular catheter


ANESTHESIA: GETA + local.


ESTIMATED BLOOD LOSS: 10 cc.


FINDINGS : Bloody-colored Clear CSF under highl pressure


DRAINS: None


COMPLICATIONS: None.


DISPOSITION: Stable to the PACU.


INDICATIONS FOR THE PROCEDURE


HISTORY:   is a 40 y/o female who was admitted to ICU [facility] with right 

basal ganglia intracerebral hemorrhage with rupture into lateral ventricle. She 

developed severe incapacitating headache . CT scan of brain on next day 

revealed vetriculomegally. In order to relieve patient from headache, decision 

has been made to proceed with placement EVD through left frontal region.


SURGICAL RISKS:


 The patient and her family were well apprised of all objectives, benefits, 

risks and potential complications of the procedure, including but not limited to

: worsening of current status, the possible need for further procedures, the 

risk of infection, headaches, CSF leak, possible spinal nerve injury resulting 

in paralysis, infection, injury to major vessels causing hemorrhage, stroke, 

loss of language function, coma and even death. No assurance was given whether 

symptoms would improve following the procedure. The surgery is technically 

difficult procedure and despite the significant discomfort for the patient and 

the best effort of the physician, the surgery may be unsuccessful or may need 

to be aborted.  Informed consent was obtained and secured in the chart after 

the patient and family voiced understanding of these risks and decided to 

proceed with the operation.


DESCRIPTION OF THE PROCEDURE


The patient was transferred to the operating room. She was given preoperative 

prophylactic IV antibiotics.


ANESTHESIA: The patient was sedated and intubated without difficulty by the 

anesthesia service. Eyes were taped shut after ointment was applied to prevent 

corneal abrasion. A Rosa Hugger was placed over the upper body to maintain 

control of core body temperature. 


POSITIONING: The patient was turned supine on operation table. Head was 

positioned on jelly donat. All pressure points were carefully padded. 


OPERATIVE TECHNIQUE: The hair was clipped on the left side and pre-prepping was 

done utilizing alcohol. Superficial landmarks were identified which included 

midline, inner canthus and external auditory meatus. The planned incision was 

outlined according to these structures.


The patient was prepped and draped in the standard sterile fashion. The marked 

skin incision was performed utilizing a monopolar cautery blade down to the 

bone. A self-retaining retractor was inserted. Utilizing the high-speed 

pneumatic drill a heather hole was performed down to the dura. The dura was opened 

with monopolar cautery. Using shunt introducer, the ventricular catheter was 

passed into the ventricle without difficulty. Opening pressure was noted to be 

high and CSF blood-colored. The trocar with ventricular catheter was passed 

distally through the skin and the trocar has been cut and removed. The plastic 

connector was inserted into the catheter and secured with a 2-0 silk tie. The 

catheter was anchored to the scalp with 2-0 silk. The catheter was confirmed to 

be dripping well. The drainage system was connected. The system was set to 

drain at 10 cm above level with the external auditory meatus. The scalp wound 

was then closed with running 2-0 Vicryl and surgical staples, taking care not 

to puncture the underlying catheter and metal staples. Bacitracin ointment was 

applied and sterile dressing was placed over the closed wound.


All sponge counts, needle counts and instrument counts were correct at the end 

of the case times two. The patient tolerated the procedure well, without any 

complications and was transferred in stable condition to the recovery room.











MARY ALICE HAN MD Oct 5, 2017 13:13

## 2017-10-06 VITALS — DIASTOLIC BLOOD PRESSURE: 70 MMHG | SYSTOLIC BLOOD PRESSURE: 115 MMHG

## 2017-10-06 VITALS — DIASTOLIC BLOOD PRESSURE: 89 MMHG | SYSTOLIC BLOOD PRESSURE: 145 MMHG

## 2017-10-06 VITALS — DIASTOLIC BLOOD PRESSURE: 58 MMHG | SYSTOLIC BLOOD PRESSURE: 117 MMHG

## 2017-10-06 VITALS — SYSTOLIC BLOOD PRESSURE: 114 MMHG | DIASTOLIC BLOOD PRESSURE: 61 MMHG

## 2017-10-06 VITALS — SYSTOLIC BLOOD PRESSURE: 122 MMHG | DIASTOLIC BLOOD PRESSURE: 71 MMHG

## 2017-10-06 VITALS — SYSTOLIC BLOOD PRESSURE: 120 MMHG | DIASTOLIC BLOOD PRESSURE: 61 MMHG

## 2017-10-06 VITALS — SYSTOLIC BLOOD PRESSURE: 144 MMHG | DIASTOLIC BLOOD PRESSURE: 74 MMHG

## 2017-10-06 VITALS — DIASTOLIC BLOOD PRESSURE: 54 MMHG | SYSTOLIC BLOOD PRESSURE: 107 MMHG

## 2017-10-06 VITALS — SYSTOLIC BLOOD PRESSURE: 118 MMHG | DIASTOLIC BLOOD PRESSURE: 74 MMHG

## 2017-10-06 VITALS — DIASTOLIC BLOOD PRESSURE: 58 MMHG | SYSTOLIC BLOOD PRESSURE: 120 MMHG

## 2017-10-06 VITALS — DIASTOLIC BLOOD PRESSURE: 64 MMHG | SYSTOLIC BLOOD PRESSURE: 139 MMHG

## 2017-10-06 VITALS — SYSTOLIC BLOOD PRESSURE: 123 MMHG | DIASTOLIC BLOOD PRESSURE: 63 MMHG

## 2017-10-06 VITALS — DIASTOLIC BLOOD PRESSURE: 64 MMHG | SYSTOLIC BLOOD PRESSURE: 129 MMHG

## 2017-10-06 VITALS — DIASTOLIC BLOOD PRESSURE: 74 MMHG | SYSTOLIC BLOOD PRESSURE: 119 MMHG

## 2017-10-06 VITALS — SYSTOLIC BLOOD PRESSURE: 146 MMHG | DIASTOLIC BLOOD PRESSURE: 81 MMHG

## 2017-10-06 LAB
ALBUMIN SERPL BCG-MCNC: 3.4 GM/DL (ref 3.2–5.2)
ALBUMIN/GLOB SERPL: 1.03 {RATIO} (ref 1–1.93)
ALP SERPL-CCNC: 78 U/L (ref 45–117)
ALT SERPL W P-5'-P-CCNC: 10 U/L (ref 12–78)
ANION GAP SERPL CALC-SCNC: 10 MEQ/L (ref 8–16)
APPEARANCE CSF: (no result)
AST SERPL-CCNC: 6 U/L (ref 15–37)
BILIRUB SERPL-MCNC: 0.9 MG/DL (ref 0.2–1)
BUN SERPL-MCNC: 4 MG/DL (ref 7–18)
CALCIUM SERPL-MCNC: 8.3 MG/DL (ref 8.5–10.1)
CHLORIDE SERPL-SCNC: 105 MEQ/L (ref 98–107)
CO2 SERPL-SCNC: 24 MEQ/L (ref 21–32)
COLOR CSF: (no result)
CREAT SERPL-MCNC: 0.38 MG/DL (ref 0.55–1.02)
CSF DIFF IF INDICATED?: YES
CSF MONONUCLEAR CELL %: 25.7 % (ref 0–0)
CSF POLYMORPHONUCLEAR CELL %: 74.3 (ref 0–0)
ERYTHROCYTE [DISTWIDTH] IN BLOOD BY AUTOMATED COUNT: 16.9 % (ref 11.5–14.5)
GFR SERPL CREATININE-BSD FRML MDRD: > 60 ML/MIN/{1.73_M2} (ref 58–?)
GLUCOSE CSF-MCNC: 45 MG/DL (ref 40–75)
GLUCOSE SERPL-MCNC: 80 MG/DL (ref 70–105)
MCH RBC QN AUTO: 28.3 PG (ref 27–33)
MCHC RBC AUTO-ENTMCNC: 32.4 G/DL (ref 32–36.5)
MCV RBC AUTO: 87.3 FL (ref 80–96)
PLATELET # BLD AUTO: 231 10^3/UL (ref 150–450)
POTASSIUM SERPL-SCNC: 3.2 MEQ/L (ref 3.5–5.1)
PROT SERPL-MCNC: 6.7 GM/DL (ref 6.4–8.2)
SODIUM SERPL-SCNC: 139 MEQ/L (ref 136–145)
TUBE # CSF: (no result)
WBC # BLD AUTO: 12.7 10^3/UL (ref 4–10)

## 2017-10-06 RX ADMIN — MORPHINE SULFATE PRN MG: 2 INJECTION, SOLUTION INTRAMUSCULAR; INTRAVENOUS at 16:35

## 2017-10-06 RX ADMIN — SODIUM CHLORIDE, PRESERVATIVE FREE SCH ML: 5 INJECTION INTRAVENOUS at 06:00

## 2017-10-06 RX ADMIN — DEXTROSE MONOHYDRATE SCH MLS/HR: 50 INJECTION, SOLUTION INTRAVENOUS at 20:50

## 2017-10-06 RX ADMIN — SODIUM CHLORIDE SCH MLS/HR: 9 INJECTION, SOLUTION INTRAVENOUS at 19:32

## 2017-10-06 RX ADMIN — SODIUM CHLORIDE SCH MLS/HR: 9 INJECTION, SOLUTION INTRAVENOUS at 20:17

## 2017-10-06 RX ADMIN — SODIUM CHLORIDE, PRESERVATIVE FREE SCH ML: 5 INJECTION INTRAVENOUS at 13:09

## 2017-10-06 RX ADMIN — MORPHINE SULFATE PRN MG: 2 INJECTION, SOLUTION INTRAMUSCULAR; INTRAVENOUS at 06:43

## 2017-10-06 RX ADMIN — HYDROCODONE BITARTRATE AND ACETAMINOPHEN PRN TAB: 5; 325 TABLET ORAL at 20:55

## 2017-10-06 RX ADMIN — ONDANSETRON PRN MG: 2 INJECTION INTRAMUSCULAR; INTRAVENOUS at 09:17

## 2017-10-06 RX ADMIN — HYDROCODONE BITARTRATE AND ACETAMINOPHEN PRN TAB: 5; 325 TABLET ORAL at 06:43

## 2017-10-06 RX ADMIN — DEXTROSE MONOHYDRATE SCH MG: 50 INJECTION, SOLUTION INTRAVENOUS at 09:07

## 2017-10-06 RX ADMIN — SODIUM CHLORIDE, PRESERVATIVE FREE SCH ML: 5 INJECTION INTRAVENOUS at 20:55

## 2017-10-06 RX ADMIN — METOCLOPRAMIDE PRN MG: 5 INJECTION, SOLUTION INTRAMUSCULAR; INTRAVENOUS at 13:08

## 2017-10-06 RX ADMIN — MORPHINE SULFATE PRN MG: 2 INJECTION, SOLUTION INTRAMUSCULAR; INTRAVENOUS at 09:09

## 2017-10-06 RX ADMIN — MORPHINE SULFATE PRN MG: 2 INJECTION, SOLUTION INTRAMUSCULAR; INTRAVENOUS at 19:31

## 2017-10-06 RX ADMIN — ONDANSETRON PRN MG: 2 INJECTION INTRAMUSCULAR; INTRAVENOUS at 21:59

## 2017-10-06 RX ADMIN — MORPHINE SULFATE PRN MG: 2 INJECTION, SOLUTION INTRAMUSCULAR; INTRAVENOUS at 04:43

## 2017-10-06 RX ADMIN — MORPHINE SULFATE PRN MG: 2 INJECTION, SOLUTION INTRAMUSCULAR; INTRAVENOUS at 21:59

## 2017-10-06 RX ADMIN — HYDROCODONE BITARTRATE AND ACETAMINOPHEN PRN TAB: 5; 325 TABLET ORAL at 01:03

## 2017-10-06 RX ADMIN — HYDROCODONE BITARTRATE AND ACETAMINOPHEN PRN TAB: 5; 325 TABLET ORAL at 10:53

## 2017-10-06 RX ADMIN — POTASSIUM CHLORIDE AND SODIUM CHLORIDE SCH MLS/HR: 900; 150 INJECTION, SOLUTION INTRAVENOUS at 11:59

## 2017-10-06 RX ADMIN — MORPHINE SULFATE PRN MG: 2 INJECTION, SOLUTION INTRAMUSCULAR; INTRAVENOUS at 13:09

## 2017-10-06 RX ADMIN — HYDROCODONE BITARTRATE AND ACETAMINOPHEN PRN TAB: 5; 325 TABLET ORAL at 14:58

## 2017-10-06 NOTE — IPNPDOC
Subjective


Date Seen


The patient was seen on 10/6/17.





Subjective


Chief Complaint/HPI


The patient is a 41-year-old female admitted with a reason for visit of 

Hypertensive Emergency,Ich.


Events since last encounter


complains of severe headache which she feels has increased after clamping the 

IVD. She got some morphine in the am without any improvement in pain. she is 

due for a CT head ithis evening after keeping the IVD drain clamped all day . 

If that CT head does not show any worsening then the planned is to remove the 

IVD this evening.





Objective


Physical Examination


General Exam:  Positive: Alert, Cooperative, Mild Distress


Eye Exam:  Positive: PERRLA, Conjunctiva & lids normal, EOMI, 


   Negative: Sclera icteric


ENT Exam:  Positive: Atraumatic, Mucous membr. moist/pink, Pharynx Normal


Neck Exam:  Positive: Supple, 


   Negative: JVD, thyromegaly


Heart Exam:  Positive: Rate Normal, Regular Rhythm, Normal S1, Normal S2, 


   Negative: Murmurs, Rubs


Telemetry:  Positive: No significant arrhythmia


Abdomen Exam:  Positive: Normal bowel sounds, Soft, 


   Negative: Tenderness, Hepatospenomegaly


Extremity Exam:  Positive: Normal pulses, 


   Negative: Clubbing, Cyanosis, Edema





Assessment /Plan


Problems





(1) ICH (intracerebral hemorrhage)


Status:  Acute


Problem Text:  Right basal ganglia hemorrhage with tracking of blood to the 

ventricles. 


etiology undetermined. No history of chronic hypertension , no history of any 

bleeding disorder, no igns of infection , CSF did not show any signs of 

infection 


will need to be evaluated for vasculitis. 


CT angio of head was negative for any aneurysms or vascular malformations. Did 

not show any tumor. 


Has IVD drain in place.


Pain control as per Neurosurgery. 


utox in progress.  





(2) Hypertensive emergency


Status:  Acute


Problem Text:  possibly due to acute intracranial bleed. 


now BP controlled with amlodipine. 








Plan/VTE


VTE Prophylaxis Ordered?:  Yes





VS, I&O, 24H, Fishbone


Vital Signs/I&O





Vital Signs








  Date Time  Temp Pulse Resp B/P (MAP) Pulse Ox O2 Delivery O2 Flow Rate FiO2


 


10/6/17 10:53   14     


 


10/6/17 09:09  88  120/61    


 


10/6/17 09:02 99.6       


 


10/6/17 08:02     99 Room Air  














I&O- Last 24 Hours up to 6 AM 


 


 10/7/17





 06:00


 


Output Total 370 ml


 


Balance -370 ml











Laboratory Data


24H LABS


Laboratory Tests 2


10/6/17 04:59: 


Anion Gap 10, Glomerular Filtration Rate > 60.0, Blood Urea Nitrogen 4L, 

Creatinine 0.38L, Sodium Level 139, Potassium Level 3.2L, Chloride Level 105, 

Carbon Dioxide Level 24, Calcium Level 8.3L, Aspartate Amino Transf (AST/SGOT) 

6L, Alanine Aminotransferase (ALT/SGPT) 10L, Alkaline Phosphatase 78, Total 

Bilirubin 0.9, Total Protein 6.7, Albumin 3.4, Albumin/Globulin Ratio 1.03


CBC/BMP


Laboratory Tests


10/6/17 04:59








Red Blood Count 3.71 L, Mean Corpuscular Volume 87.3, Mean Corpuscular 

Hemoglobin 28.3, Mean Corpuscular Hemoglobin Concent 32.4, Red Cell 

Distribution Width 16.9 H, Calcium Level 8.3 L, Aspartate Amino Transf (AST/SGOT

) 6 L, Alanine Aminotransferase (ALT/SGPT) 10 L, Alkaline Phosphatase 78, Total 

Bilirubin 0.9, Total Protein 6.7, Albumin 3.4


Microbiology





Microbiology


10/5/17 Gram Stain - Final, Resulted


          


10/5/17 CSF Culture, Resulted


          Pending











JAZMIN WADE MD Oct 6, 2017 12:28

## 2017-10-06 NOTE — REPUSA
MRI of the brain.

Clinical history: previous hemorrhage.

Technique: Multiecho multiplanar MRI images of the brain were obtained without administration of cont
rast. Diffusion weighted images with ADC mapping was also obtained.

Comparison: CT, 10/4/2017.

Findings:

The ventricles and sulci are symmetric bilaterally. Subacute/chronic hemorrhage remains in the right 
basal ganglia, extending into the right lateral ventricle. There is also a small amount of blood with
in the left lateral ventricle. These findings have not changed significantly since the prior study. A
 drainage catheter is demonstrated entry in the left frontal lobe and terminating in the left lateral
 ventricle. Restricted diffusion is appreciated in the right basal ganglia. The brain parenchyma demo
nstrates uniform and normal signal on all sequences. There is no midline shift, mass effect, or extra
-axial fluid collection. The midline intracranial structures do not demonstrate any gross abnormaliti
es. The cervical cranial junction is intact. The orbits are unremarkable. The visualized paranasal si
nuses and mastoid air cells are clear. The osseous structures and superficial soft tissues are unrema
rkable. The vascular structures demonstrate appropriate flow voids.

Impression:

1. No significant change in the subacute bleeding in the right basal ganglia. Intraventricular blood 
remains in place. Ventricular catheter is noted, without evidence of hydrocephalus.

2. Focal restricted diffusion in the right basal ganglia suggest acute infarct as well. No surroundin
g edema or mass effect.

     Electronically signed by SALINA HUFF MD on 10/06/2017 07:12:10 PM ET

## 2017-10-06 NOTE — CR
DATE OF CONSULTATION:  10/06/2017

 

CONSULTATION REPORT FOR:  Emanuel Han MD

 

Patient is seen in the intensive care unit (ICU).  Available medical data,

electronic health record (EHR), and pertinent radiographic studies were 
discussed

with Dr. Han.  She is a very pleasant 41-year-old lady who was admitted

recently on account of severe headaches and hypertension.  Her workup showed

bleeding in the carotid nucleus as well as extension into the ventricle system.

The patient has remained stable, though continues to complain of severe 
headaches

and photophobia.  She underwent a left ventriculostomy with drainage of

hemorrhagic cerebrospinal fluid (CSF).  She still has an external ventricular

drain (EVD) catheter in place.  She is awake and alert, oriented times three.

She is complaining of significant headaches and marked photophobia and neck 
pain which is much worse and different than her usual neck pain and stiffness.  
She denies

any other symptoms at this point.

 

PAST MEDICAL HISTORY:  She does not report any history of hypertension or any

other medical issues of concern.  She does give an extensive history of smoking

tobacco since age 11.  She denied any taking regular medication at home prior to

this admission.  I believe she had not seen any healthcare provider in many

years, though she is convinced that she does not have high blood pressure.  She

denies use of any recreational drugs and drinks a couple of times a week.  
Family

history, however, is positive for hypertension.

 

REVIEW OF SYSTEMS:  As noted above, is essentially unremarkable despite leading

questions.

 

On examination, she is found to be awake and alert, oriented times three.  
Pupils

are nearly equal and reacting.  Extraocular movements are full, though coarse.

Romberg's is not tested, she is in bed complaining of marked pain in her head 
and

neck when she tries to move.  She is moving all limbs, though there is mild 
right

deep tendon reflex (DTR) preponderance.  The neck is quite stiff.  Plantars are

downgoing or equivocal.  Pedal pulses are palpable.  There are no central

language deficits.  She does demonstrate bilateral intermittent hemisensory

inattention which may reflect subcortical microvascular, lacunar, and/or similar

changes, though certainly this could be a physiologic finding for her.

Cerebellar examination is unremarkable except for occasional dysrhythmia,

dysmetria, and visual overshooting , though these findings and the

vestibular findings could be from her medications.



She has evidence of cervical spondylosis, with dysesthesias along C2,3, more so 
on the left.

 

Recent cerebrospinal fluid (CSF) studies as well as lab data was reviewed.

 

IMPRESSION:  Basal ganglia hemorrhage with extension into the ventricle system

and acute hydrocephalus.

 

PLAN/RECOMMENDATIONS:

At this time, I would obtain an MRI with and without contrast as well as EEG.

Agree with the cerebrospinal fluid (CSF) drainage by her existent external

ventricular drain (EVD), though would drain it continuously.  Also, would

maintain seizure precautions.  Pertinent instructions were given to her nurses 
in

the intensive care unit (ICU).  I would review her repeat CT scan after several

hours of drainage this evening.  Family conference was held with her and her 
son.

Natural course of the history of her illness and all options remaining with

possible cerebrospinal (CSF) diversion discussed.  The patient and her son

understand that if she underwent a cerebrospinal (CSF) diversion procedure, like

a ventriculoperitoneal shunt that would not be curative.  Also, I have requested

an infectious disease evaluation on account of recent spike in the temperature.



Please orders for further recommendations.

 

Thank for you asking me to evaluate your patient.  Please do not hesitate to 
call

me.



Rigoberto Real MD

[facility]

## 2017-10-06 NOTE — REPUSA
CT of the head

Clinical history: Bleeding.

Comparison: 10/5/2017.

Technique: Multiple axial CT images were obtained through the head without administration of contrast
.

Findings: The ventricles and sulci are symmetric bilaterally. Ventricular shunt is in place within th
e left lateral ventricle. There is stable appearance of hemorrhage in the right basal ganglia, extend
ing into the right lateral ventricle . A small amount of hemorrhage is suspected in the left ventricl
e as well. There is findings suspicious for a small infarct in the right basal ganglia. There is no m
idline shift, mass effect, or extra-axial fluid collection. The osseous structures are unremarkable. 
The visualized paranasal sinuses and mastoid air cells are clear.

Impression:

1. Hemorrhage within the right basal ganglia and lateral ventricle are grossly stable in size and adithya
earance since the prior study. No new evidence of hemorrhage.

2. Suspected involving infarct in the right basal ganglia.

3. No evidence of hydrocephalus. Shunt is in place.

4. Overall, stable examination.

     Electronically signed by SALINA HUFF MD on 10/06/2017 07:41:48 PM ET

## 2017-10-06 NOTE — PHACANCOPD
PHARMACY VANCOMYCIN DOSING


Pt Demographics


Demographics


Patient Age:41 , Weight:73.000  , Gender: female


Adjusted Body Weight


Date: 10/6/17, Adjusted Body Weight:  Kg





Events Past 24 Hours


Events Past 24 Hours:  YES: Fever, Elevation in WBC, 


   NO: Dialysis, Diuretic Therapy, Change in CrCl, Pending Diagnostics, Pending 

Procedures, Other





Vancomycin


Vancomycin Target Ranges:  15-20 mcg/ml


Vancomycin Load Y/N:  No


Load Dose Date Time


Vancomycin Load Dose:         Date:         Time:


Vancomycin Dose


Date: 10/6/17. Current Vancomycin Dose: [1G Q8H@1800]


Intermittent Dosing?:  No





Labs


Labs





 Vital Signs








Label Value  Date Time


 


Patient Temperature 99.4 degrees F 10/6/17 1602


 


Temperature Source Temporal 10/6/17 1602


 


Patient Temperature 100.7 degrees F 10/6/17 1302


 


Temperature Source Temporal 10/6/17 1302


 


Patient Temperature 101.3 degrees F 10/6/17 1202


 


Temperature Source Temporal 10/6/17 1202














Item Value  Date Time


 


White Blood Count 11.1 10^3/uL H 10/4/17 0457


 


White Blood Count 10.8 10^3/uL H 10/5/17 0522


 


White Blood Count 12.7 10^3/uL H 10/6/17 0459


 


Creatinine 0.41 MG/DL L 10/4/17 0457


 


Creatinine 0.47 MG/DL L 10/5/17 0522


 


Creatinine 0.38 MG/DL L 10/6/17 0459








Micro





Microbiology


10/6/17 Blood Culture, Received


          Pending


10/6/17 Gram Stain - Final, Resulted


          


10/6/17 CSF Culture, Resulted


          Pending


10/5/17 Gram Stain - Final, Resulted


          


10/5/17 CSF Culture, Resulted


          Pending


Creatinine Clearance


Date:10/6/17. Creatinine Clearance: .





Assessment and Plan


Maintaining Current Dose?:  Yes


Reason for dose change:  No Dose Change





Pharmacist Note


Pharmacist Note


Date: 10/6/17. Pharmacist note: Pt. presented to the ED on 10/3 with a severe 

headache. She was admitted for hypertensive emergency secondary to an 

intracranial bleed. An IVD drain was placed today and patient is now has a 

fever and an increase in WBC. Dr. Graff has been consulted and she has started 

the patient on meropenem 2g q8h and vancomycin 1g q8h. I have scheduled a 

trough for 1700 tomorrow before her 4th dose. We  will continue to monitor and 

adjust dose as needed.











[facility] Oct 6, 2017 17:58

## 2017-10-07 VITALS — SYSTOLIC BLOOD PRESSURE: 149 MMHG | DIASTOLIC BLOOD PRESSURE: 71 MMHG

## 2017-10-07 VITALS — DIASTOLIC BLOOD PRESSURE: 75 MMHG | SYSTOLIC BLOOD PRESSURE: 143 MMHG

## 2017-10-07 VITALS — SYSTOLIC BLOOD PRESSURE: 136 MMHG | DIASTOLIC BLOOD PRESSURE: 68 MMHG

## 2017-10-07 VITALS — DIASTOLIC BLOOD PRESSURE: 66 MMHG | SYSTOLIC BLOOD PRESSURE: 128 MMHG

## 2017-10-07 VITALS — DIASTOLIC BLOOD PRESSURE: 73 MMHG | SYSTOLIC BLOOD PRESSURE: 127 MMHG

## 2017-10-07 VITALS — DIASTOLIC BLOOD PRESSURE: 83 MMHG | SYSTOLIC BLOOD PRESSURE: 154 MMHG

## 2017-10-07 LAB
ALBUMIN SERPL BCG-MCNC: 3 GM/DL (ref 3.2–5.2)
ALBUMIN/GLOB SERPL: 0.86 {RATIO} (ref 1–1.93)
ALP SERPL-CCNC: 78 U/L (ref 45–117)
ALT SERPL W P-5'-P-CCNC: 17 U/L (ref 12–78)
ANION GAP SERPL CALC-SCNC: 8 MEQ/L (ref 8–16)
AST SERPL-CCNC: 27 U/L (ref 15–37)
BILIRUB SERPL-MCNC: 0.5 MG/DL (ref 0.2–1)
BUN SERPL-MCNC: 8 MG/DL (ref 7–18)
CALCIUM SERPL-MCNC: 8.2 MG/DL (ref 8.5–10.1)
CHLORIDE SERPL-SCNC: 102 MEQ/L (ref 98–107)
CO2 SERPL-SCNC: 26 MEQ/L (ref 21–32)
CREAT SERPL-MCNC: 0.55 MG/DL (ref 0.55–1.02)
ERYTHROCYTE [DISTWIDTH] IN BLOOD BY AUTOMATED COUNT: 16.7 % (ref 11.5–14.5)
GFR SERPL CREATININE-BSD FRML MDRD: > 60 ML/MIN/{1.73_M2} (ref 58–?)
GLUCOSE SERPL-MCNC: 121 MG/DL (ref 70–105)
MCH RBC QN AUTO: 28.5 PG (ref 27–33)
MCHC RBC AUTO-ENTMCNC: 32.6 G/DL (ref 32–36.5)
MCV RBC AUTO: 87.3 FL (ref 80–96)
PLATELET # BLD AUTO: 199 10^3/UL (ref 150–450)
POTASSIUM SERPL-SCNC: 3.2 MEQ/L (ref 3.5–5.1)
PROT SERPL-MCNC: 6.5 GM/DL (ref 6.4–8.2)
SODIUM SERPL-SCNC: 136 MEQ/L (ref 136–145)
WBC # BLD AUTO: 12.3 10^3/UL (ref 4–10)

## 2017-10-07 RX ADMIN — ZONISAMIDE SCH MG: 50 CAPSULE ORAL at 17:31

## 2017-10-07 RX ADMIN — SODIUM CHLORIDE SCH MLS/HR: 9 INJECTION, SOLUTION INTRAVENOUS at 16:37

## 2017-10-07 RX ADMIN — MORPHINE SULFATE PRN MG: 2 INJECTION, SOLUTION INTRAMUSCULAR; INTRAVENOUS at 14:25

## 2017-10-07 RX ADMIN — DEXTROSE MONOHYDRATE SCH MLS/HR: 50 INJECTION, SOLUTION INTRAVENOUS at 11:03

## 2017-10-07 RX ADMIN — ONDANSETRON PRN MG: 2 INJECTION INTRAMUSCULAR; INTRAVENOUS at 20:09

## 2017-10-07 RX ADMIN — SODIUM CHLORIDE, PRESERVATIVE FREE SCH ML: 5 INJECTION INTRAVENOUS at 22:18

## 2017-10-07 RX ADMIN — SODIUM CHLORIDE SCH MLS/HR: 9 INJECTION, SOLUTION INTRAVENOUS at 16:00

## 2017-10-07 RX ADMIN — MORPHINE SULFATE PRN MG: 2 INJECTION, SOLUTION INTRAMUSCULAR; INTRAVENOUS at 11:03

## 2017-10-07 RX ADMIN — DEXTROSE MONOHYDRATE SCH MLS/HR: 50 INJECTION, SOLUTION INTRAVENOUS at 02:00

## 2017-10-07 RX ADMIN — MORPHINE SULFATE PRN MG: 2 INJECTION, SOLUTION INTRAMUSCULAR; INTRAVENOUS at 04:01

## 2017-10-07 RX ADMIN — POTASSIUM CHLORIDE SCH MEQ: 750 TABLET, EXTENDED RELEASE ORAL at 09:48

## 2017-10-07 RX ADMIN — HYDROCODONE BITARTRATE AND ACETAMINOPHEN PRN TAB: 5; 325 TABLET ORAL at 09:49

## 2017-10-07 RX ADMIN — DEXTROSE MONOHYDRATE SCH MLS/HR: 50 INJECTION, SOLUTION INTRAVENOUS at 18:00

## 2017-10-07 RX ADMIN — ZONISAMIDE SCH MG: 50 CAPSULE ORAL at 13:52

## 2017-10-07 RX ADMIN — DEXTROSE MONOHYDRATE SCH MG: 50 INJECTION, SOLUTION INTRAVENOUS at 08:55

## 2017-10-07 RX ADMIN — MORPHINE SULFATE PRN MG: 2 INJECTION, SOLUTION INTRAMUSCULAR; INTRAVENOUS at 16:41

## 2017-10-07 RX ADMIN — POTASSIUM CHLORIDE SCH MEQ: 750 TABLET, EXTENDED RELEASE ORAL at 20:13

## 2017-10-07 RX ADMIN — HYDROCODONE BITARTRATE AND ACETAMINOPHEN PRN TAB: 5; 325 TABLET ORAL at 01:00

## 2017-10-07 RX ADMIN — HYDROCODONE BITARTRATE AND ACETAMINOPHEN PRN TAB: 5; 325 TABLET ORAL at 18:36

## 2017-10-07 RX ADMIN — MORPHINE SULFATE PRN MG: 2 INJECTION, SOLUTION INTRAMUSCULAR; INTRAVENOUS at 20:09

## 2017-10-07 RX ADMIN — ONDANSETRON PRN MG: 2 INJECTION INTRAMUSCULAR; INTRAVENOUS at 13:04

## 2017-10-07 RX ADMIN — MORPHINE SULFATE PRN MG: 2 INJECTION, SOLUTION INTRAMUSCULAR; INTRAVENOUS at 22:18

## 2017-10-07 RX ADMIN — SODIUM CHLORIDE, PRESERVATIVE FREE SCH ML: 5 INJECTION INTRAVENOUS at 13:52

## 2017-10-07 RX ADMIN — HYDROCODONE BITARTRATE AND ACETAMINOPHEN PRN TAB: 5; 325 TABLET ORAL at 22:48

## 2017-10-07 RX ADMIN — SODIUM CHLORIDE SCH MLS/HR: 9 INJECTION, SOLUTION INTRAVENOUS at 02:11

## 2017-10-07 RX ADMIN — HYDROCODONE BITARTRATE AND ACETAMINOPHEN PRN TAB: 5; 325 TABLET ORAL at 14:31

## 2017-10-07 RX ADMIN — SODIUM CHLORIDE SCH MLS/HR: 9 INJECTION, SOLUTION INTRAVENOUS at 01:30

## 2017-10-07 RX ADMIN — SODIUM CHLORIDE SCH MLS/HR: 9 INJECTION, SOLUTION INTRAVENOUS at 09:47

## 2017-10-07 RX ADMIN — SODIUM CHLORIDE SCH MLS/HR: 9 INJECTION, SOLUTION INTRAVENOUS at 08:55

## 2017-10-07 RX ADMIN — MORPHINE SULFATE PRN MG: 2 INJECTION, SOLUTION INTRAMUSCULAR; INTRAVENOUS at 06:23

## 2017-10-07 RX ADMIN — SODIUM CHLORIDE, PRESERVATIVE FREE SCH ML: 5 INJECTION INTRAVENOUS at 05:53

## 2017-10-07 RX ADMIN — MORPHINE SULFATE PRN MG: 2 INJECTION, SOLUTION INTRAMUSCULAR; INTRAVENOUS at 08:56

## 2017-10-07 NOTE — CR
DATE OF CONSULTATION: 10/06/2017

 

Asked to consult by Dr. Real. This consultation was done on 10/06/2017 at 4:
30.

The patient was seen in the intensive care unit (ICU).  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

admission, when she developed a severe headache in the morning.  The headache

progressively got worse. It was associated with nausea and emesis.  She was seen

in the urgent care, was noted to have a hypertensive crisis, and therefore was

sent to the emergency room.  The patient had a CT, which showed an intracranial

hemorrhage with extension into the ventricles.  The patient was started on

Cardizem drip and transferred to the intensive care unit (ICU).  She was seen in

consultation by Dr. Han, who did take her to the operating room. Had a

left frontal bur hole placement and an external ventricular device for

decompression.  The patient had been stable until October 6, when she started

developing worsening headaches and fever.  Dr. Real, who was the covering

neurosurgeon, saw the patient and asked for a consultation, as the patient had

also developed fever, increasing headache, photophobia, and neck stiffness.  The

patient had external ventricular device now 48 hours.  It has been turned off to

see if it could be removed, but when drainage had stopped, patient had the 
severe

headache with associated fever.  Dr. Real recommended a continuous drainage by

her existing external ventricular drain that evening.

 

PAST MEDICAL HISTORY:  Significant for tobacco abuse.  No history of

hypertension, diabetes, or heart disease.

 

ALLERGIES:  No known drug allergies.

 

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 Lives with her 

in [place]. She has a new puppy. She is a smoker since the age of 17, about

half to a pack a day.

 

FAMILY HISTORY:  Hypertension.

 

REVIEW OF SYSTEMS:  She has nausea, headache, neck stiffness.  No upper or lower

extremity weakness.  No dysarthria or trouble swallowing.  No chest pain, cough,

shortness breath.  Patient has nausea but no vomiting or diarrhea.  No abdominal

pain.

 

MEDICATIONS:

- potassium 40 mEq by mouth twice a day

morphine as needed

tylenol as needed

- Reglan 10 mg as needed

 

White count on admission was 10.9. White count today was 12.7, hemoglobin 10.5,

hematocrit 32.4, platelets 231.

 

Sodium 139, potassium 3.2, chloride 105, [id number], BUN 4, creatinine 0.38,

glucose 80, calcium 8.3. Total bilirubin 0.99, AST 6, ALT 10, alkaline

phosphatase 78, total protein 6.7, albumin 3.4.

 

On October 5, cerebrospinal fluid had moderate red cells, few white cell. No

organisms seen on culture, was no growth after 24 hours. On October 6, another

Gram stain was sent. Many white cells, few red cells. No organisms seen. Culture

is still pending.  Blood culture done on October 6 is pending.  Cerebrospinal

fluid (CSF) done on October 5 had 29 white cells with 25,000 red cells, per 
which

corrects for 4 extra white cells with 58% monocytes and 41% white blood cells,

Glucose of 80 and total protein of 22. On October 6, white cells have increased

to 381, red cells 33,00. That would account for only 33 white cells, and

therefore there is definite increase in her CSF 75%. Total protein has also

increased to 54. Glucose normal.

 

IMAGING:

Brain MRI done on October 6 shows no significant change in subacute bleeding in

the right basal ganglia. No evidence of hydrocephalus with focal diffusion in 
the

basal ganglia  suggesting an acute infarct as well.  No surrounding edema or 
mass

effect.  There is no midline shift.

 

Head CT done at 6:00 p.m.:  Hemorrhage in the right basal ganglia and lateral

ventricle are grossly stable in size and appearance. No evidence of hemorrhage,

and suspected infarct in the right basal ganglia.  Shunt is in place.

 

CT angiography of the head was normal with no aneurysm, AV malformation, or

atherosclerotic lesions.

 

PHYSICAL EXAMINATION:

Maximal temperature on October 6 was 101.3, currently temperature is 99.4, pulse

83, respirations 14, blood pressure 139/64, oxygen saturation 98% on room air.

HEART:  Normal S1, S2 with no murmurs, rubs, or gallops.

LUNGS:  Clear.  No wheezes, rales, or rhonchi.

ABDOMEN:  Soft, nontender with no hepatosplenomegaly.

EXTREMITIES:  No clubbing, cyanosis, or edema.  No calf tenderness.

NECK:  Mild stiffness. Left external ventricular drain stapled to the forehead

with tenderness around it. No erythema extending beyond the dressing.

NEUROLOGIC:  Alert and oriented times three. Upper and lower extremity strength

fairly normal, moving all extremities, although no formal neurologic testing was

done, as the patient was having a bad headache.

 

IMPRESSION:

This is a 41-year-old female who had an external ventricular drain placed 48

hours ago for intracranial bleeding.  The patient has developed a fever of 101.3

associated with mild leukocytosis, an increase in cerebrospinal fluid (CSF)

pleocytosis with 80 white cells with a predominance of neutrophils as compared 
to

the day before.  This could be healthcare-associated ventriculitis from the

external ventricular drain, and with the associated fever, worsening headache,

and neck stiffness, I would suggest sending blood cultures, polymerase chain

reaction (PCR) testing on CSF fluid, and starting broad-spectrum IV antibiotic 
to

cover for hospital-acquired ventriculitis.

 

PLAN:  Blood culture has been ordered.  CSF will be sent to [place] for PCR

testing. Meropenem 2 grams IV every 8 hours, vancomycin 1 gram IV every 8 hours.

Will continue antibiotics until results of cultures available.

 

Thank you for consultation.

RUMA

## 2017-10-08 VITALS — SYSTOLIC BLOOD PRESSURE: 158 MMHG | DIASTOLIC BLOOD PRESSURE: 72 MMHG

## 2017-10-08 VITALS — SYSTOLIC BLOOD PRESSURE: 141 MMHG | DIASTOLIC BLOOD PRESSURE: 80 MMHG

## 2017-10-08 VITALS — SYSTOLIC BLOOD PRESSURE: 131 MMHG | DIASTOLIC BLOOD PRESSURE: 80 MMHG

## 2017-10-08 VITALS — DIASTOLIC BLOOD PRESSURE: 68 MMHG | SYSTOLIC BLOOD PRESSURE: 137 MMHG

## 2017-10-08 VITALS — DIASTOLIC BLOOD PRESSURE: 81 MMHG | SYSTOLIC BLOOD PRESSURE: 124 MMHG

## 2017-10-08 VITALS — DIASTOLIC BLOOD PRESSURE: 90 MMHG | SYSTOLIC BLOOD PRESSURE: 144 MMHG

## 2017-10-08 LAB
ALBUMIN SERPL BCG-MCNC: 2.9 GM/DL (ref 3.2–5.2)
ALBUMIN/GLOB SERPL: 0.81 {RATIO} (ref 1–1.93)
ALP SERPL-CCNC: 80 U/L (ref 45–117)
ALT SERPL W P-5'-P-CCNC: 18 U/L (ref 12–78)
ANION GAP SERPL CALC-SCNC: 6 MEQ/L (ref 8–16)
AST SERPL-CCNC: 14 U/L (ref 15–37)
BILIRUB SERPL-MCNC: 0.3 MG/DL (ref 0.2–1)
BUN SERPL-MCNC: 7 MG/DL (ref 7–18)
CALCIUM SERPL-MCNC: 8.5 MG/DL (ref 8.5–10.1)
CHLORIDE SERPL-SCNC: 104 MEQ/L (ref 98–107)
CO2 SERPL-SCNC: 28 MEQ/L (ref 21–32)
CREAT SERPL-MCNC: 0.56 MG/DL (ref 0.55–1.02)
ERYTHROCYTE [DISTWIDTH] IN BLOOD BY AUTOMATED COUNT: 16.7 % (ref 11.5–14.5)
GFR SERPL CREATININE-BSD FRML MDRD: > 60 ML/MIN/{1.73_M2} (ref 58–?)
GLUCOSE SERPL-MCNC: 95 MG/DL (ref 70–105)
MCH RBC QN AUTO: 28.3 PG (ref 27–33)
MCHC RBC AUTO-ENTMCNC: 32.3 G/DL (ref 32–36.5)
MCV RBC AUTO: 87.7 FL (ref 80–96)
PLATELET # BLD AUTO: 196 10^3/UL (ref 150–450)
POTASSIUM SERPL-SCNC: 3.7 MEQ/L (ref 3.5–5.1)
PROT SERPL-MCNC: 6.5 GM/DL (ref 6.4–8.2)
SODIUM SERPL-SCNC: 138 MEQ/L (ref 136–145)
WBC # BLD AUTO: 7 10^3/UL (ref 4–10)

## 2017-10-08 RX ADMIN — HYDROCODONE BITARTRATE AND ACETAMINOPHEN PRN TAB: 5; 325 TABLET ORAL at 13:59

## 2017-10-08 RX ADMIN — MORPHINE SULFATE PRN MG: 2 INJECTION, SOLUTION INTRAMUSCULAR; INTRAVENOUS at 20:44

## 2017-10-08 RX ADMIN — DEXTROSE MONOHYDRATE SCH MG: 50 INJECTION, SOLUTION INTRAVENOUS at 08:07

## 2017-10-08 RX ADMIN — SODIUM CHLORIDE SCH MLS/HR: 9 INJECTION, SOLUTION INTRAVENOUS at 08:41

## 2017-10-08 RX ADMIN — SODIUM CHLORIDE SCH MLS/HR: 9 INJECTION, SOLUTION INTRAVENOUS at 01:02

## 2017-10-08 RX ADMIN — SODIUM CHLORIDE SCH MLS/HR: 9 INJECTION, SOLUTION INTRAVENOUS at 00:24

## 2017-10-08 RX ADMIN — SODIUM CHLORIDE, PRESERVATIVE FREE SCH ML: 5 INJECTION INTRAVENOUS at 21:48

## 2017-10-08 RX ADMIN — DEXTROSE MONOHYDRATE SCH MLS/HR: 50 INJECTION, SOLUTION INTRAVENOUS at 17:22

## 2017-10-08 RX ADMIN — SODIUM CHLORIDE SCH MLS/HR: 9 INJECTION, SOLUTION INTRAVENOUS at 08:07

## 2017-10-08 RX ADMIN — POTASSIUM CHLORIDE SCH MEQ: 750 TABLET, EXTENDED RELEASE ORAL at 20:05

## 2017-10-08 RX ADMIN — ZONISAMIDE SCH MG: 100 CAPSULE ORAL at 20:05

## 2017-10-08 RX ADMIN — MORPHINE SULFATE PRN MG: 2 INJECTION, SOLUTION INTRAMUSCULAR; INTRAVENOUS at 18:08

## 2017-10-08 RX ADMIN — SODIUM CHLORIDE SCH MLS/HR: 9 INJECTION, SOLUTION INTRAVENOUS at 16:32

## 2017-10-08 RX ADMIN — SODIUM CHLORIDE, PRESERVATIVE FREE SCH ML: 5 INJECTION INTRAVENOUS at 06:00

## 2017-10-08 RX ADMIN — DEXTROSE MONOHYDRATE SCH MLS/HR: 50 INJECTION, SOLUTION INTRAVENOUS at 09:39

## 2017-10-08 RX ADMIN — DEXTROSE MONOHYDRATE SCH MLS/HR: 50 INJECTION, SOLUTION INTRAVENOUS at 01:32

## 2017-10-08 RX ADMIN — SODIUM CHLORIDE, PRESERVATIVE FREE SCH ML: 5 INJECTION INTRAVENOUS at 15:58

## 2017-10-08 RX ADMIN — MORPHINE SULFATE PRN MG: 2 INJECTION, SOLUTION INTRAMUSCULAR; INTRAVENOUS at 08:20

## 2017-10-08 RX ADMIN — ONDANSETRON PRN MG: 2 INJECTION INTRAMUSCULAR; INTRAVENOUS at 09:48

## 2017-10-08 RX ADMIN — METOCLOPRAMIDE PRN MG: 5 INJECTION, SOLUTION INTRAMUSCULAR; INTRAVENOUS at 20:43

## 2017-10-08 RX ADMIN — HYDROCODONE BITARTRATE AND ACETAMINOPHEN PRN TAB: 5; 325 TABLET ORAL at 05:27

## 2017-10-08 RX ADMIN — POTASSIUM CHLORIDE SCH MEQ: 750 TABLET, EXTENDED RELEASE ORAL at 08:08

## 2017-10-08 RX ADMIN — ONDANSETRON PRN MG: 2 INJECTION INTRAMUSCULAR; INTRAVENOUS at 18:08

## 2017-10-08 RX ADMIN — ZONISAMIDE SCH MG: 50 CAPSULE ORAL at 08:08

## 2017-10-08 RX ADMIN — HYDROCODONE BITARTRATE AND ACETAMINOPHEN PRN TAB: 5; 325 TABLET ORAL at 09:41

## 2017-10-08 RX ADMIN — SODIUM CHLORIDE SCH MLS/HR: 9 INJECTION, SOLUTION INTRAVENOUS at 16:00

## 2017-10-08 NOTE — IPNPDOC
Subjective


Date Seen


The patient was seen on 10/8/17.





Subjective


Chief Complaint/HPI


The patient is a 41-year-old female admitted with a reason for visit of 

Hypertensive Emergency,Ich.


Events since last encounter


continues to have severe headache and photophobia.  Has IVD drain in place with 

intermittent clamping. no fever or chills.





Objective


Physical Examination


General Exam:  Positive: Alert, Cooperative, Mild Distress


Eye Exam:  Positive: PERRLA, Conjunctiva & lids normal, EOMI, 


   Negative: Sclera icteric


ENT Exam:  Positive: Atraumatic, Mucous membr. moist/pink, Pharynx Normal


Neck Exam:  Positive: Supple, 


   Negative: JVD, thyromegaly


Heart Exam:  Positive: Rate Normal, Regular Rhythm, Normal S1, Normal S2, 


   Negative: Murmurs, Rubs


Telemetry:  Positive: No significant arrhythmia


Abdomen Exam:  Positive: Normal bowel sounds, Soft, 


   Negative: Tenderness, Hepatospenomegaly


Extremity Exam:  Positive: Normal pulses, 


   Negative: Clubbing, Cyanosis, Edema





Assessment /Plan


Problems





(1) ICH (intracerebral hemorrhage)


Status:  Acute


Problem Text:  Right basal ganglia hemorrhage with tracking of blood to the 

ventricles. 


etiology undetermined. No history of chronic hypertension , no history of any 

bleeding disorder, no igns of infection , CSF did not show any signs of 

infection 


will need to be evaluated for vasculitis. 


CT angio of head was negative for any aneurysms or vascular malformations. Did 

not show any tumor. 


Has IVD drain in place.


Pain control as per Neurosurgery. 


 





(2) Hypertensive emergency


Status:  Resolved


Problem Text:  possibly due to acute intracranial bleed. 


now BP controlled with amlodipine. 








Plan/VTE


VTE Prophylaxis Ordered?:  Yes





VS, I&O, 24H, [facility]


Vital Signs/I&O





Vital Signs








  Date Time  Temp Pulse Resp B/P (MAP) Pulse Ox O2 Delivery O2 Flow Rate FiO2


 


10/8/17 09:41   14     


 


10/8/17 08:09  84  158/72    


 


10/8/17 04:00 98.8    95 Room Air  











Laboratory Data


24H LABS


Laboratory Tests 2


10/7/17 16:49: Vancomycin Level Trough 19.3


10/8/17 04:24: 


Anion Gap 6L, Glomerular Filtration Rate > 60.0, Blood Urea Nitrogen 7, 

Creatinine 0.56, Sodium Level 138, Potassium Level 3.7, Chloride Level 104, 

Carbon Dioxide Level 28, Calcium Level 8.5, Aspartate Amino Transf (AST/SGOT) 

14L, Alanine Aminotransferase (ALT/SGPT) 18, Alkaline Phosphatase 80, Total 

Bilirubin 0.3, Total Protein 6.5, Albumin 2.9L, Albumin/Globulin Ratio 0.81L


CBC/BMP


Laboratory Tests


10/8/17 04:24








Calcium Level 8.5, Aspartate Amino Transf (AST/SGOT) 14 L, Alanine 

Aminotransferase (ALT/SGPT) 18, Alkaline Phosphatase 80, Total Bilirubin 0.3, 

Total Protein 6.5, Albumin 2.9 L





10/8/17 04:25








Red Blood Count 3.57 L, Mean Corpuscular Volume 87.7, Mean Corpuscular 

Hemoglobin 28.3, Mean Corpuscular Hemoglobin Concent 32.3, Red Cell 

Distribution Width 16.7 H


Microbiology





Microbiology


10/6/17 Blood Culture - Preliminary, Resulted


          No growth after 24 hours . All specim...


10/6/17 Gram Stain - Final, Complete


          


10/6/17 CSF Culture - Final, Complete


          


10/5/17 Gram Stain - Final, Complete


          


10/5/17 CSF Culture - Final, Complete


          


10/5/17 , Received


          Pending











JAZMIN WADE MD Oct 8, 2017 11:58

## 2017-10-08 NOTE — CR
DATE OF CONSULTATION: 10/07/2017

 

REFERRING PHYSICIAN:  Dr. Rigoberto Real

 

REASON FOR CONSULTATION:  Intracerebral hemorrhage.

 

HISTORY OF PRESENT ILLNESS:  Areli Damon is a 41-year-old woman who was at

her baseline state of health until Sunday morning when she developed severe

headache, which was 9 or 10 out of 10 in intensity, associated with nausea,

vomiting, photophobia and phonophobia.  She went to Urgent Care on Monday and her

blood pressure was about 180/100.  Her headache did not go away after receiving

medications at Urgent Care.  She came to [facility] emergency

department on Tuesday and was found to have right-sided basal ganglia cerebral

hemorrhage, which extended into intraventricular hemorrhage.  Her blood pressure

was 194/106.  Patient states that she does not routinely check her blood

pressure.  She had oral surgery in 2017, and at that time, her blood pressure was

144/84.  She had her yearly physical for work 3 weeks ago but does not know her

blood pressure.  She had  external ventricular drain (EVD) placed due to her

cerebral and intraventricular hemorrhage.  She denies any head injuries.  She

denies any use of blood thinners.  She denies any bleeding disorders in the

family.  Her grandmother had stroke when she was above 80 years old.  She used to

have occasional headaches in past.  She had daily headaches over the last 1 week

since her symptoms started on Sunday.  She denies any neck or back pain.  She

denies any seizures.  Her current headache gets worse by sitting and standing.

Sometimes it does get worse when she is even laying down.

 

PAST MEDICAL HISTORY:  Oral surgery and possible hypertension.

 

SOCIAL HISTORY:  She smokes tobacco.  She denies alcohol or illicit drugs.

 

ALLERGIES:  None.

 

CURRENT MEDICATIONS:  She is on vancomycin, meropenem, morphine, hydrocodone.

She was not taking any home medications before her hospitalization.

 

FAMILY HISTORY:  Grandmother had stroke when she was 82 years old.  She also had

lung and throat cancer.

 

REVIEW OF SYSTEMS:  All systems were reviewed and found to be noncontributory

except as mentioned in history present illness.

 

PHYSICAL EXAMINATION:

Blood pressure 143/75, pulse 77, respiratory rate 18.

Heart:  Regular rate and rhythm.

Lungs:  Clear to auscultation.

Abdomen:  Soft, nontender, nondistended.

Neurological exam:  Patient is awake, alert, oriented to place, person and time.

Normal speech, comprehension and repetition.  Extraocular muscles are intact.  No

facial weakness. Tongue and uvula are midline.  5/5 strength in all four

extremities.  Deep tendon flexes are 2+ throughout.  Normal sensation to light

touch, pinprick, vibration sensation, etc.  Her gait is mildly unsteady.  There

is no dysmetria or ataxia.  There is no fracture or gross musculoskeletal

abnormalities.  There is no rash.  There is no edema.  There is no tremor.

 

DIAGNOSTIC STUDIES:  Her CT scan of head were reviewed and showed right caudate

nucleus cerebral hemorrhage with extension into intraventricular hemorrhage.

 

MRI scan of brain showed right basal ganglia ischemic changes around cerebral

hemorrhage.  This can represent edema as this finding also seeing around her EVD.

 

 

CT angiography of head was unremarkable.

 

Her CBC showed hemoglobin 10.1, platelet count 196, with normal complete

metabolic profile.  Her coagulation profile was within normal limits.  Her CSF

showed 38,1000 WBCs and 33,000,000 RBCs, with total protein 53.9 and glucose 45.

 

ASSESSMENT:

1.  Right caudate nucleus intracerebral and intraventricular hemorrhage.

2.  Hypertensive cerebral hemorrhage is in differential diagnosis.

3.  We cannot exclude cavernous hemangioma, which may have resulted in cerebral

hemorrhage with extension into intravenous ventricular space.

4.  Edema versus ischemic change in basal ganglia around her cerebral hemorrhage

in caudate nucleus.

 

PLAN:

1.  Zonisamide 50 mg by mouth twice a day.

2.  Fioricet one tablet by mouth four times a day as needed.

3.  Carotid ultrasound.

4.  Follow with our office in 1 month after hospital discharge.

 

Edited:  10/08/2017 [id number] rony

## 2017-10-09 VITALS — DIASTOLIC BLOOD PRESSURE: 87 MMHG | SYSTOLIC BLOOD PRESSURE: 157 MMHG

## 2017-10-09 VITALS — SYSTOLIC BLOOD PRESSURE: 126 MMHG | DIASTOLIC BLOOD PRESSURE: 70 MMHG

## 2017-10-09 VITALS — SYSTOLIC BLOOD PRESSURE: 147 MMHG | DIASTOLIC BLOOD PRESSURE: 91 MMHG

## 2017-10-09 VITALS — SYSTOLIC BLOOD PRESSURE: 143 MMHG | DIASTOLIC BLOOD PRESSURE: 89 MMHG

## 2017-10-09 VITALS — SYSTOLIC BLOOD PRESSURE: 124 MMHG | DIASTOLIC BLOOD PRESSURE: 61 MMHG

## 2017-10-09 VITALS — SYSTOLIC BLOOD PRESSURE: 139 MMHG | DIASTOLIC BLOOD PRESSURE: 73 MMHG

## 2017-10-09 VITALS — DIASTOLIC BLOOD PRESSURE: 74 MMHG | SYSTOLIC BLOOD PRESSURE: 156 MMHG

## 2017-10-09 LAB
ALBUMIN SERPL BCG-MCNC: 3.2 GM/DL (ref 3.2–5.2)
ALBUMIN/GLOB SERPL: 0.84 {RATIO} (ref 1–1.93)
ALP SERPL-CCNC: 91 U/L (ref 45–117)
ALT SERPL W P-5'-P-CCNC: 16 U/L (ref 12–78)
ANION GAP SERPL CALC-SCNC: 5 MEQ/L (ref 8–16)
AST SERPL-CCNC: 9 U/L (ref 15–37)
BILIRUB SERPL-MCNC: 0.3 MG/DL (ref 0.2–1)
BUN SERPL-MCNC: 6 MG/DL (ref 7–18)
CALCIUM SERPL-MCNC: 8.8 MG/DL (ref 8.5–10.1)
CHLORIDE SERPL-SCNC: 103 MEQ/L (ref 98–107)
CO2 SERPL-SCNC: 29 MEQ/L (ref 21–32)
CREAT SERPL-MCNC: 0.59 MG/DL (ref 0.55–1.02)
ERYTHROCYTE [DISTWIDTH] IN BLOOD BY AUTOMATED COUNT: 16.9 % (ref 11.5–14.5)
GFR SERPL CREATININE-BSD FRML MDRD: > 60 ML/MIN/{1.73_M2} (ref 58–?)
GLUCOSE SERPL-MCNC: 104 MG/DL (ref 70–105)
MCH RBC QN AUTO: 28.7 PG (ref 27–33)
MCHC RBC AUTO-ENTMCNC: 32.7 G/DL (ref 32–36.5)
MCV RBC AUTO: 87.6 FL (ref 80–96)
PLATELET # BLD AUTO: 220 10^3/UL (ref 150–450)
POTASSIUM SERPL-SCNC: 3.9 MEQ/L (ref 3.5–5.1)
PROT SERPL-MCNC: 7 GM/DL (ref 6.4–8.2)
SODIUM SERPL-SCNC: 137 MEQ/L (ref 136–145)
WBC # BLD AUTO: 8.2 10^3/UL (ref 4–10)

## 2017-10-09 RX ADMIN — HYDROCODONE BITARTRATE AND ACETAMINOPHEN PRN TAB: 5; 325 TABLET ORAL at 21:53

## 2017-10-09 RX ADMIN — DEXTROSE MONOHYDRATE SCH MG: 50 INJECTION, SOLUTION INTRAVENOUS at 08:38

## 2017-10-09 RX ADMIN — POTASSIUM CHLORIDE SCH MEQ: 750 TABLET, EXTENDED RELEASE ORAL at 20:01

## 2017-10-09 RX ADMIN — ONDANSETRON PRN MG: 2 INJECTION INTRAMUSCULAR; INTRAVENOUS at 06:13

## 2017-10-09 RX ADMIN — SODIUM CHLORIDE SCH MLS/HR: 9 INJECTION, SOLUTION INTRAVENOUS at 09:50

## 2017-10-09 RX ADMIN — SODIUM CHLORIDE SCH MLS/HR: 9 INJECTION, SOLUTION INTRAVENOUS at 17:29

## 2017-10-09 RX ADMIN — HYDROCODONE BITARTRATE AND ACETAMINOPHEN PRN TAB: 5; 325 TABLET ORAL at 06:13

## 2017-10-09 RX ADMIN — SODIUM CHLORIDE SCH MLS/HR: 9 INJECTION, SOLUTION INTRAVENOUS at 01:07

## 2017-10-09 RX ADMIN — POTASSIUM CHLORIDE SCH MEQ: 750 TABLET, EXTENDED RELEASE ORAL at 08:39

## 2017-10-09 RX ADMIN — SODIUM CHLORIDE, PRESERVATIVE FREE SCH ML: 5 INJECTION INTRAVENOUS at 14:00

## 2017-10-09 RX ADMIN — ZONISAMIDE SCH MG: 100 CAPSULE ORAL at 08:38

## 2017-10-09 RX ADMIN — MORPHINE SULFATE PRN MG: 2 INJECTION, SOLUTION INTRAMUSCULAR; INTRAVENOUS at 00:35

## 2017-10-09 RX ADMIN — SODIUM CHLORIDE SCH MLS/HR: 9 INJECTION, SOLUTION INTRAVENOUS at 00:34

## 2017-10-09 RX ADMIN — MORPHINE SULFATE PRN MG: 2 INJECTION, SOLUTION INTRAMUSCULAR; INTRAVENOUS at 20:00

## 2017-10-09 RX ADMIN — ZONISAMIDE SCH MG: 100 CAPSULE ORAL at 20:01

## 2017-10-09 RX ADMIN — DEXTROSE MONOHYDRATE SCH MLS/HR: 50 INJECTION, SOLUTION INTRAVENOUS at 10:10

## 2017-10-09 RX ADMIN — ONDANSETRON PRN MG: 2 INJECTION INTRAMUSCULAR; INTRAVENOUS at 00:34

## 2017-10-09 RX ADMIN — SODIUM CHLORIDE, PRESERVATIVE FREE SCH ML: 5 INJECTION INTRAVENOUS at 05:04

## 2017-10-09 RX ADMIN — SODIUM CHLORIDE, PRESERVATIVE FREE SCH ML: 5 INJECTION INTRAVENOUS at 21:53

## 2017-10-09 RX ADMIN — DEXTROSE MONOHYDRATE SCH MLS/HR: 50 INJECTION, SOLUTION INTRAVENOUS at 21:54

## 2017-10-09 RX ADMIN — SODIUM CHLORIDE SCH MLS/HR: 9 INJECTION, SOLUTION INTRAVENOUS at 08:38

## 2017-10-09 RX ADMIN — DEXTROSE MONOHYDRATE SCH MLS/HR: 50 INJECTION, SOLUTION INTRAVENOUS at 01:46

## 2017-10-09 RX ADMIN — MORPHINE SULFATE PRN MG: 2 INJECTION, SOLUTION INTRAMUSCULAR; INTRAVENOUS at 03:56

## 2017-10-09 RX ADMIN — HYDROCODONE BITARTRATE AND ACETAMINOPHEN PRN TAB: 5; 325 TABLET ORAL at 12:51

## 2017-10-09 NOTE — REPUSA
CLINICAL HISTORY: Right basal ganglia hemorrhage.

TECHNIQUE: Multiple axial brain CT scan sections were obtained from base to vertex without contrast a
dministration.

COMMENTS:

Comparison to prior exam on 10/6/2017.

Displacement of the tip of the ventriculostomy tube which is sitting in the left frontotemporal white
 matter.

The tip of the ventriculostomy tube is no longer in the ventricular system.

Decreased density of the right caudate nucleus hemorrhage.

Decreased associated mass effect.

IMPRESSION:

Displacement of the tip of the left ventriculostomy tube which is now sitting in the left frontal per
iventricular white matter.

Decreased density of the hemorrhage in the right basal ganglia.

Decreased mass effect and edema.

Thank you for your kind referral of this patient.

     Electronically signed by SAM VILLASENOR MD on 10/09/2017 06:10:45 AM ET

## 2017-10-09 NOTE — IPNPDOC
Subjective


Date Seen


The patient was seen on 10/9/17.





Subjective


Chief Complaint/HPI


The patient is a 41-year-old female admitted with a reason for visit of 

Hypertensive Emergency,Ich.


Events since last encounter


She continues to have a severe headache, but photophobia appears to be slightly 

improved, albeit still present.


General:  Reports: Fatigue, Malaise


Constitutional:  Denies: Chills, Fever, Night Sweats


ENT:  Reports: Head Aches, 


   Denies: Sore Throat


Skin:  Denies: Rash, Lesions, Bruising


Pulmonary:  Denies: Dyspnea, Cough


Cardiovascular:  Denies: Chest Pain, Palpitations


Gastrointestinal:  Denies: Nausea, Vomiting, Abdominal Pain, Diarrhea, 

Constipation


Neurological:  Denies: Weakness





Objective


Physical Examination


General Exam:  Positive: Alert, Cooperative, Mild Distress, Other (catheter 

still in place in her head)


Eye Exam:  Positive: PERRLA, Conjunctiva & lids normal, EOMI, 


   Negative: Sclera icteric


ENT Exam:  Positive: Atraumatic, Mucous membr. moist/pink, Pharynx Normal


Neck Exam:  Positive: Supple, 


   Negative: JVD, thyromegaly


Heart Exam:  Positive: Rate Normal, Regular Rhythm, Normal S1, Normal S2, 


   Negative: Murmurs, Rubs


Telemetry:  Positive: No significant arrhythmia


Abdomen Exam:  Positive: Normal bowel sounds, Soft, 


   Negative: Tenderness, Hepatospenomegaly


Extremity Exam:  Positive: Normal pulses, 


   Negative: Clubbing, Cyanosis, Edema





Assessment /Plan


Problems





(1) ICH (intracerebral hemorrhage)


Status:  Acute


Problem Text:  Right basal ganglia hemorrhage with tracking of blood to the 

ventricles. 


etiology undetermined. No history of chronic hypertension , no history of any 

bleeding disorder, no signs of infection, vasculitis workup still pending. 


Has IVD drain in place.


Pain control as per Neurosurgery. 


 





(2) Hypertensive emergency


Status:  Resolved


Problem Text:  HTN may possibly be due to acute intracranial bleed itself. 


BP continues to be within acceptable limits with amlodipine. 








Plan/VTE


VTE Prophylaxis Ordered?:  Yes (TEDs & Seqs)





VS, I&O, 24H, Fishbone


Vital Signs/I&O





Vital Signs








  Date Time  Temp Pulse Resp B/P (MAP) Pulse Ox O2 Delivery O2 Flow Rate FiO2


 


10/9/17 12:51   12  99 Room Air  


 


10/9/17 08:38  96  147/91    


 


10/9/17 08:00 98.3       














I&O- Last 24 Hours up to 6 AM 


 


 10/10/17





 06:00


 


Intake Total 690 ml


 


Output Total 250 ml


 


Balance 440 ml











Laboratory Data


24H LABS


Laboratory Tests 2


10/9/17 04:36: 


Anion Gap 5L, Glomerular Filtration Rate > 60.0, Blood Urea Nitrogen 6L, 

Creatinine 0.59, Sodium Level 137, Potassium Level 3.9, Chloride Level 103, 

Carbon Dioxide Level 29, Calcium Level 8.8, Aspartate Amino Transf (AST/SGOT) 9L

, Alanine Aminotransferase (ALT/SGPT) 16, Alkaline Phosphatase 91, Total 

Bilirubin 0.3, Total Protein 7.0, Albumin 3.2, Albumin/Globulin Ratio 0.84L


10/9/17 09:15: Vancomycin Level Trough 23.3H


CBC/BMP


Laboratory Tests


10/9/17 04:36








Red Blood Count 3.80 L, Mean Corpuscular Volume 87.6, Mean Corpuscular 

Hemoglobin 28.7, Mean Corpuscular Hemoglobin Concent 32.7, Red Cell 

Distribution Width 16.9 H, Calcium Level 8.8, Aspartate Amino Transf (AST/SGOT) 

9 L, Alanine Aminotransferase (ALT/SGPT) 16, Alkaline Phosphatase 91, Total 

Bilirubin 0.3, Total Protein 7.0, Albumin 3.2


Microbiology





Microbiology


10/6/17 Blood Culture - Preliminary, Resulted


          No Growth after 48 hours. All Specime...


10/6/17 Gram Stain - Final, Complete


          


10/6/17 CSF Culture - Final, Complete


          


10/5/17 Gram Stain - Final, Complete


          


10/5/17 CSF Culture - Final, Complete


          


10/5/17 - Final, Complete





GME ATTESTATION


GME ATTESTATION


My preceptor for this patient encounter was physically present in the building 

during the encounter and was fully available. As needed, all aspects of the 

patient interview, examination, medical decision making process, and medical 

care plan development were reviewed and approved by the preceptor. Preceptor is 

aware and concurs with the plan as stated in the body of this note and will 

attest to such by his/her cosignature.











DEREK HOLT DO Oct 9, 2017 13:20

## 2017-10-09 NOTE — EEG
DATE OF PROCEDURE:  10/09/2017

 

DIAGNOSIS:  Cerebral hemorrhage.

 

EEG NUMBER:  

 

HISTORY:  The patient is a 41-year-old woman who was admitted at [facility] due to right basal ganglia cerebral hemorrhage with extension into

ventricles.  This EEG was done to rule out epileptic potential.

 

TECHNICAL DESCRIPTION:  This digital EEG was recorded by 21 scalp, ear and two

EKG electrodes and was reviewed in bipolar and referential montages following

reformatting in 10-20 international electrode placement system.

 

CURRENT MEDICATIONS:  Zonisamide, vancomycin, meropenem, Fioricet, morphine,

hydrocodone, etc.

 

INTERPRETATION:  The patient was noted to be in awake and drowsy states during

this EEG.  Resting awake background rhythm consisted of well-formed posterior

dominant rhythm with anterior/posterior gradient comprising of 9 Hz alpha

activity measuring 15-40 microvolts in amplitude, which was symmetric and

reactive to eye opening.  Attenuation of posterior dominant rhythm was seen

during transition into drowsiness.  No sleep was achieved.  Hyperventilation

could not be performed.  Photic stimulation at 3-30 Hz elicited symmetric photic

driving especially at mid frequencies.  EKG revealed normal sinus rhythm.  No

focal, lateralizing or epileptiform abnormalities were seen.  No clinical or

electrographic seizures were recorded.

 

CONCLUSION:  This EEG in awake and drowsy states is within normal limits.

## 2017-10-09 NOTE — REP
Duplex carotid sonography:

 

History:  Intracranial hemorrhage, stroke.

 

Findings:  Antegrade flow is observed in both vertebral arteries.

 

Right carotid:  The right common carotid artery is unremarkable on

two-dimensional scanning.  There is minimal intimal thickening in the proximal

ICA and proximal ECA on two-dimensional scanning.  No significant plaquing is

seen.  Color flow and spectral Doppler interrogation are unremarkable on the

right.

 

Velocity chart right carotid:

 

Right CCA  cm/S

 

Right ICA PSV 62

 

EDV 34 right

 

ECA PSV 60

 

Right ICA/CCA ratio normal 0.58

 

Impression:

 

0-15% narrowing in the right ICA by Doppler velocity criteria.  Minimal intimal

thickening.

 

Left carotid:  The left common carotid artery is unremarkable on two-dimensional

scanning.  There is minimal intimal thickening in the flow divide and proximal

ICA and proximal ECA on the left side.  No significant plaquing.  Color flow and

spectral Doppler interrogation are unremarkable on the left.

 

Velocity chart left carotid:

 

Left CCA PSV 97 cm/S

 

Left ICA PSV 74

 

EDV 35

 

Left ECA PSV 90

 

Left ICA/CCA ratio is normal 0.77

 

Impression:

 

0-15% category narrowing in the left ICA by Doppler velocity criteria.

 

 

Signed by

Fareed Gaffney MD 10/09/2017 04:45 P

## 2017-10-09 NOTE — CR
DATE OF CONSULTATION: 10/09/2017

 

CHIEF COMPLAINT:

1. Headache.

2. Low back pain.

 

HISTORY OF PRESENT ILLNESS: Areli is a 41-year-old female who is admitted on

10/3 due to severe headache, due to a right-sided basal ganglia cerebral

hemorrhage, which extended into intraventricular hemorrhage.  The patient had a

external ventricular drain placed on 10/05/2017 with Dr. Han.  Has been

having severe headache and photophobia since admission.  She began to have

significant low back pain, which began a few days ago.  Rating her head pain as 
a

3/10 VAS at present. The patient feels there is a correlation of headaches with

use of IV morphine.  Has been doing fairly well with Fioricet.  Headache is

frontal in nature and aggravated by light and upright position.  No fever.

Reporting normal bowel and bladder function.

 

PAST MEDICAL HISTORY: Denies.

 

SOCIAL HISTORY: Chronic tobacco use, approximately 1 to 1-1/2 packs per day 
times

several years. Reports a small amount of alcohol intake weekly. Denies illicit

drug use.  The patient is a nurse.  Lives with her .

 

FAMILY HISTORY: Hypertension.

 

REVIEW OF SYSTEMS: 11-point review of systems of systems is negative except for

what is noted in HPI.

 

PHYSICAL EXAMINATION: The patient is lying flat on her side in a darkened ICU

room.  Family at bedside.  Vitals:  98.3, 92, 16, /70, O2 sats 98% on room

air.  Cardiac: S1, S2.  Normal rate and rhythm.  Respiratory: Lung sounds clear.

Respirations: Nonlabored.  Pupils equal, round, reactive to light and

accommodation.  Cranial nerves II-XII grossly intact.  Able to move upper and

lower extremities freely.  Muscle strength upper and lower extremities 2+/5.

Mild tenderness with palpation over the spine in the lumbar paraspinal area.

 

ASSESSMENT:

1.  Headache, status post intracranial bleed.

2.  Low back pain.

 

PLAN: I would recommend adding tizanidine 4 mg three times a day for both her

headache as well as her low back pain.  May consider increasing Fioricet to 2

tablets every 4-6 hours as needed for severe headache.  Recommend discontinuing

IV morphine due to the patient's complaints of increased headache after taking

this.  Recommend José-Mosqueda topical three times a day to her lower back area.  If

 IV pain medication is necessary may want to consider trying Dilaudid or

fentanyl IV for pain greater than 8/10. Consider addition of Topamax 50 mg twice

a day for long term headache management.

 

Thank you for allowing us to participate in the care of your patient, Areli Damon.  If you have any questions or concerns please do not hesitate to contact

me.

 

 

 

Sincerely,

 

Rachael Soto, Family Nurse Practitioner

[facility]

[facility]

## 2017-10-09 NOTE — IPN
DATE:  10/09/2017

 

Areli is doing so much better today.  She is afebrile for the past 24 hours.

She has no nausea, vomiting or diarrhea.  No abdominal pain.  External

ventricular drain has been removed.  The patient is anxious to go home.  She is

not going to smoke again and she would to have nicotine gum on discharge.  Her

headache has markedly improved.

 

Temperature is 98.6, pulse 90, respirations 14, blood pressure 143/89, O2 sat 99
%

on room air.  Heart:  Normal S1, S2.  No murmurs.  Lungs are clear.  No wheezes,

rales or rhonchi.  Abdomen:  Soft, nontender.  Extremities:  No edema.  Neck:

Mild stiffness, but much better. Drain has been removed.

 

LABORATORY DATA: White count is 8.2, hemoglobin 10.9, hematocrit 33.3, platelets

220.  Sodium 137, potassium 3.9, chloride 103, bicarb 29, BUN 6, creatinine 0.6,

glucose 104, calcium 8.8, AST 9, ALT 16, alkaline phosphatase 91, total protein

7, albumin 3.2.  CSF culture from 10/05 and 10/06 were negative.  No growth.

Blood culture was no growth. Catheter culture is pending.  CSF spinal fluid by

PCR sent 10/05 was negative as well.

 

IMPRESSION:

Intracranial hemorrhage in the right basal ganglia, doing much better. The

patient's headache has markedly improved.

PLAN continue IV Vanco meropenem until drain culture final

MTDD

## 2017-10-10 VITALS — DIASTOLIC BLOOD PRESSURE: 68 MMHG | SYSTOLIC BLOOD PRESSURE: 140 MMHG

## 2017-10-10 VITALS — SYSTOLIC BLOOD PRESSURE: 138 MMHG | DIASTOLIC BLOOD PRESSURE: 87 MMHG

## 2017-10-10 VITALS — DIASTOLIC BLOOD PRESSURE: 74 MMHG | SYSTOLIC BLOOD PRESSURE: 115 MMHG

## 2017-10-10 VITALS — DIASTOLIC BLOOD PRESSURE: 85 MMHG | SYSTOLIC BLOOD PRESSURE: 124 MMHG

## 2017-10-10 VITALS — SYSTOLIC BLOOD PRESSURE: 123 MMHG | DIASTOLIC BLOOD PRESSURE: 85 MMHG

## 2017-10-10 VITALS — DIASTOLIC BLOOD PRESSURE: 74 MMHG | SYSTOLIC BLOOD PRESSURE: 113 MMHG

## 2017-10-10 RX ADMIN — SODIUM CHLORIDE SCH MLS/HR: 9 INJECTION, SOLUTION INTRAVENOUS at 01:27

## 2017-10-10 RX ADMIN — SODIUM CHLORIDE SCH MLS/HR: 9 INJECTION, SOLUTION INTRAVENOUS at 18:08

## 2017-10-10 RX ADMIN — MORPHINE SULFATE PRN MG: 2 INJECTION, SOLUTION INTRAMUSCULAR; INTRAVENOUS at 04:42

## 2017-10-10 RX ADMIN — DEXTROSE MONOHYDRATE SCH MLS/HR: 50 INJECTION, SOLUTION INTRAVENOUS at 21:43

## 2017-10-10 RX ADMIN — DEXTROSE MONOHYDRATE SCH MG: 50 INJECTION, SOLUTION INTRAVENOUS at 08:59

## 2017-10-10 RX ADMIN — ZONISAMIDE SCH MG: 100 CAPSULE ORAL at 09:00

## 2017-10-10 RX ADMIN — SODIUM CHLORIDE SCH MLS/HR: 9 INJECTION, SOLUTION INTRAVENOUS at 00:56

## 2017-10-10 RX ADMIN — HYDROCODONE BITARTRATE AND ACETAMINOPHEN PRN TAB: 5; 325 TABLET ORAL at 19:07

## 2017-10-10 RX ADMIN — DEXTROSE MONOHYDRATE SCH MLS/HR: 50 INJECTION, SOLUTION INTRAVENOUS at 10:04

## 2017-10-10 RX ADMIN — ZONISAMIDE SCH MG: 100 CAPSULE ORAL at 20:38

## 2017-10-10 RX ADMIN — MORPHINE SULFATE PRN MG: 2 INJECTION, SOLUTION INTRAMUSCULAR; INTRAVENOUS at 20:33

## 2017-10-10 RX ADMIN — POTASSIUM CHLORIDE SCH MEQ: 750 TABLET, EXTENDED RELEASE ORAL at 20:38

## 2017-10-10 RX ADMIN — SODIUM CHLORIDE SCH MLS/HR: 9 INJECTION, SOLUTION INTRAVENOUS at 16:38

## 2017-10-10 RX ADMIN — HYDROCODONE BITARTRATE AND ACETAMINOPHEN PRN TAB: 5; 325 TABLET ORAL at 13:51

## 2017-10-10 RX ADMIN — SODIUM CHLORIDE, PRESERVATIVE FREE SCH ML: 5 INJECTION INTRAVENOUS at 20:38

## 2017-10-10 RX ADMIN — POTASSIUM CHLORIDE SCH MEQ: 750 TABLET, EXTENDED RELEASE ORAL at 08:59

## 2017-10-10 RX ADMIN — SODIUM CHLORIDE, PRESERVATIVE FREE SCH ML: 5 INJECTION INTRAVENOUS at 13:52

## 2017-10-10 RX ADMIN — SODIUM CHLORIDE SCH MLS/HR: 9 INJECTION, SOLUTION INTRAVENOUS at 09:45

## 2017-10-10 RX ADMIN — HYDROCODONE BITARTRATE AND ACETAMINOPHEN PRN TAB: 5; 325 TABLET ORAL at 06:20

## 2017-10-10 RX ADMIN — SODIUM CHLORIDE SCH MLS/HR: 9 INJECTION, SOLUTION INTRAVENOUS at 08:59

## 2017-10-10 RX ADMIN — SODIUM CHLORIDE, PRESERVATIVE FREE SCH ML: 5 INJECTION INTRAVENOUS at 06:22

## 2017-10-11 VITALS — SYSTOLIC BLOOD PRESSURE: 121 MMHG | DIASTOLIC BLOOD PRESSURE: 70 MMHG

## 2017-10-11 VITALS — DIASTOLIC BLOOD PRESSURE: 79 MMHG | SYSTOLIC BLOOD PRESSURE: 134 MMHG

## 2017-10-11 VITALS — SYSTOLIC BLOOD PRESSURE: 129 MMHG | DIASTOLIC BLOOD PRESSURE: 75 MMHG

## 2017-10-11 LAB
ANION GAP SERPL CALC-SCNC: 8 MEQ/L (ref 8–16)
BASOPHILS # BLD AUTO: 0.1 10^3/UL (ref 0–0.2)
BASOPHILS NFR BLD AUTO: 0.7 % (ref 0–1)
BUN SERPL-MCNC: 9 MG/DL (ref 7–18)
CALCIUM SERPL-MCNC: 9.2 MG/DL (ref 8.5–10.1)
CHLORIDE SERPL-SCNC: 103 MEQ/L (ref 98–107)
CO2 SERPL-SCNC: 25 MEQ/L (ref 21–32)
CREAT SERPL-MCNC: 0.6 MG/DL (ref 0.55–1.02)
EOSINOPHIL # BLD AUTO: 0.2 10^3/UL (ref 0–0.5)
EOSINOPHIL NFR BLD AUTO: 2.8 % (ref 0–3)
ERYTHROCYTE [DISTWIDTH] IN BLOOD BY AUTOMATED COUNT: 17.1 % (ref 11.5–14.5)
GFR SERPL CREATININE-BSD FRML MDRD: > 60 ML/MIN/{1.73_M2} (ref 58–?)
GLUCOSE SERPL-MCNC: 96 MG/DL (ref 70–105)
IMM GRANULOCYTES NFR BLD: 0.4 % (ref 0–0)
LYMPHOCYTES # BLD AUTO: 1.6 10^3/UL (ref 1.5–4.5)
LYMPHOCYTES NFR BLD AUTO: 21.8 % (ref 24–44)
MAGNESIUM SERPL-MCNC: 2.7 MG/DL (ref 1.8–2.4)
MCH RBC QN AUTO: 27.8 PG (ref 27–33)
MCHC RBC AUTO-ENTMCNC: 31.9 G/DL (ref 32–36.5)
MCV RBC AUTO: 87.3 FL (ref 80–96)
MONOCYTES # BLD AUTO: 0.6 10^3/UL (ref 0–0.8)
MONOCYTES NFR BLD AUTO: 8.4 % (ref 0–5)
NEUTROPHILS # BLD AUTO: 5 10^3/UL (ref 1.8–7.7)
NEUTROPHILS NFR BLD AUTO: 65.9 % (ref 36–66)
NRBC BLD AUTO-RTO: 0 % (ref 0–0)
PLATELET # BLD AUTO: 225 10^3/UL (ref 150–450)
POTASSIUM SERPL-SCNC: 4.1 MEQ/L (ref 3.5–5.1)
SODIUM SERPL-SCNC: 136 MEQ/L (ref 136–145)
WBC # BLD AUTO: 7.5 10^3/UL (ref 4–10)

## 2017-10-11 RX ADMIN — ZONISAMIDE SCH MG: 100 CAPSULE ORAL at 08:50

## 2017-10-11 RX ADMIN — DEXTROSE MONOHYDRATE SCH MLS/HR: 50 INJECTION, SOLUTION INTRAVENOUS at 20:14

## 2017-10-11 RX ADMIN — HYDROCODONE BITARTRATE AND ACETAMINOPHEN PRN TAB: 7.5; 325 TABLET ORAL at 10:20

## 2017-10-11 RX ADMIN — SODIUM CHLORIDE, PRESERVATIVE FREE SCH ML: 5 INJECTION INTRAVENOUS at 06:02

## 2017-10-11 RX ADMIN — ZONISAMIDE SCH MG: 100 CAPSULE ORAL at 20:13

## 2017-10-11 RX ADMIN — HYDROCODONE BITARTRATE AND ACETAMINOPHEN PRN TAB: 7.5; 325 TABLET ORAL at 15:19

## 2017-10-11 RX ADMIN — SODIUM CHLORIDE SCH MLS/HR: 9 INJECTION, SOLUTION INTRAVENOUS at 17:06

## 2017-10-11 RX ADMIN — SODIUM CHLORIDE, PRESERVATIVE FREE SCH ML: 5 INJECTION INTRAVENOUS at 13:16

## 2017-10-11 RX ADMIN — SODIUM CHLORIDE SCH MLS/HR: 9 INJECTION, SOLUTION INTRAVENOUS at 09:53

## 2017-10-11 RX ADMIN — SODIUM CHLORIDE SCH MLS/HR: 9 INJECTION, SOLUTION INTRAVENOUS at 00:58

## 2017-10-11 RX ADMIN — MORPHINE SULFATE PRN MG: 2 INJECTION, SOLUTION INTRAMUSCULAR; INTRAVENOUS at 02:02

## 2017-10-11 RX ADMIN — HYDROCODONE BITARTRATE AND ACETAMINOPHEN PRN TAB: 5; 325 TABLET ORAL at 01:31

## 2017-10-11 RX ADMIN — DEXTROSE MONOHYDRATE SCH MLS/HR: 50 INJECTION, SOLUTION INTRAVENOUS at 10:45

## 2017-10-11 RX ADMIN — DEXTROSE MONOHYDRATE SCH MG: 50 INJECTION, SOLUTION INTRAVENOUS at 08:49

## 2017-10-11 RX ADMIN — SODIUM CHLORIDE, PRESERVATIVE FREE SCH ML: 5 INJECTION INTRAVENOUS at 21:08

## 2017-10-11 RX ADMIN — SODIUM CHLORIDE SCH MLS/HR: 9 INJECTION, SOLUTION INTRAVENOUS at 17:58

## 2017-10-11 RX ADMIN — POTASSIUM CHLORIDE SCH MEQ: 750 TABLET, EXTENDED RELEASE ORAL at 20:13

## 2017-10-11 RX ADMIN — POTASSIUM CHLORIDE SCH MEQ: 750 TABLET, EXTENDED RELEASE ORAL at 08:50

## 2017-10-11 RX ADMIN — SODIUM CHLORIDE SCH MLS/HR: 9 INJECTION, SOLUTION INTRAVENOUS at 01:31

## 2017-10-11 RX ADMIN — SODIUM CHLORIDE SCH MLS/HR: 9 INJECTION, SOLUTION INTRAVENOUS at 08:49

## 2017-10-11 RX ADMIN — MORPHINE SULFATE PRN MG: 2 INJECTION, SOLUTION INTRAMUSCULAR; INTRAVENOUS at 18:31

## 2017-10-11 RX ADMIN — MORPHINE SULFATE PRN MG: 2 INJECTION, SOLUTION INTRAMUSCULAR; INTRAVENOUS at 06:02

## 2017-10-11 RX ADMIN — SODIUM CHLORIDE, PRESERVATIVE FREE SCH ML: 5 INJECTION INTRAVENOUS at 05:17

## 2017-10-11 RX ADMIN — HYDROCODONE BITARTRATE AND ACETAMINOPHEN PRN TAB: 7.5; 325 TABLET ORAL at 20:13

## 2017-10-11 RX ADMIN — MORPHINE SULFATE PRN MG: 2 INJECTION, SOLUTION INTRAMUSCULAR; INTRAVENOUS at 13:24

## 2017-10-11 RX ADMIN — ONDANSETRON PRN MG: 2 INJECTION INTRAMUSCULAR; INTRAVENOUS at 06:01

## 2017-10-11 NOTE — IPNPDOC
Date Seen


The patient was seen on 10/11/17.





Progress Note


Hospitalist Progress Note





Subjective: Patient endorses a lot of back pain, she is also wondering if she 

still needs her antibiotics





Objective:





Physical Exam:


Vitals: 


Vital Sign - Last 24 Hours








 10/10/17 10/10/17 10/10/17 10/10/17





 15:00 15:30 16:39 19:07


 


Temp 98.5   


 


Pulse 87   


 


Resp 18  16 16


 


B/P (MAP) 123/85 (98)   


 


Pulse Ox 100   


 


O2 Delivery Room Air Room Air  


 


    





 10/10/17 10/10/17 10/10/17 10/11/17





 20:33 21:43 22:00 00:20


 


Temp   98.2 


 


Pulse   83 


 


Resp 19 19 19 


 


B/P (MAP)   138/87 (104) 


 


Pulse Ox   99 


 


O2 Delivery Room Air  Room Air Room Air


 


    





 10/11/17 10/11/17 10/11/17 10/11/17





 01:31 02:01 02:02 03:11


 


Resp 19 19 18 16





 10/11/17 10/11/17 10/11/17 10/11/17





 06:00 06:02 07:10 08:50


 


Temp 97.8   


 


Pulse 75   75


 


Resp 19 16 16 


 


B/P (MAP) 134/79 (97)   134/79


 


Pulse Ox 98   


 


O2 Delivery Room Air   


 


    





 10/11/17 10/11/17 10/11/17 10/11/17





 10:20 10:50 11:18 11:48


 


Resp 18 18 18 18


 


O2 Delivery Room Air Room Air Room Air Room Air





 10/11/17 10/11/17  





 13:24 13:34  


 


Resp 18 18  


 


O2 Delivery Room Air Room Air  








General: Awake, alert, lying flat on her back


HEENT: Normocephalic, atraumatic, moist mucous membranes


CV: Regular rate and rhythm


Lungs: Clear to auscultation bilaterally


Abd: Soft, nontender


Extremities: No Edema


Neuro: Alert and oriented 3





Labs and Imaging:


Laboratory Tests


10/11/17 06:00








Red Blood Count 3.95 L, Mean Corpuscular Volume 87.3, Mean Corpuscular 

Hemoglobin 27.8, Mean Corpuscular Hemoglobin Concent 31.9 L, Red Cell 

Distribution Width 17.1 H, Neutrophils (%) (Auto) 65.9, Lymphocytes (%) (Auto) 

21.8 L, Monocytes (%) (Auto) 8.4 H, Eosinophils (%) (Auto) 2.8, Basophils (%) (

Auto) 0.7, Neutrophils # (Auto) 5.0, Lymphocytes # (Auto) 1.6, Monocytes # (Auto

) 0.6, Eosinophils # (Auto) 0.2, Basophils # (Auto) 0.1, Calcium Level 9.2











Assessment and Plan:





41-year-old female, previously healthy, who presented to the emergency 

department with headache and was found to have a spontaneous intracranial 

hemorrhage with associated hypertensive crisis. She has been admitted by 

neurosurgery, and we have been consulted for management of her blood pressure. 

She is also being followed by neurology, infectious disease, and pain 

management.





1. Intracranial hemorrhage: Management as per neurosurgery, her primary team.





2. Concern for possible ventriculitis: Management as per infectious disease.





3. Hypertensive crisis: The patient has no prior history of hypertension. She 

has been initiated on daily Norvasc 5 mg, which is currently controlling her 

blood pressure well. We'll continue this at this time and continue to monitor.





DVT prophylaxis: As per her primary team





Thank you for the opportunity to participate in the care of this patient's 

hypertension. We will continue to follow along and manage it.





[facility]


Vital Signs/I&O





Vital Signs








  Date Time  Temp Pulse Resp B/P (MAP) Pulse Ox O2 Delivery O2 Flow Rate FiO2


 


10/11/17 13:34   18   Room Air  


 


10/11/17 08:50  75  134/79    


 


10/11/17 06:00 97.8    98   














I&O- Last 24 Hours up to 6 AM 


 


 10/12/17





 05:59


 


Intake Total 1100 ml


 


Output Total 950 ml


 


Balance 150 ml











Laboratory Data


24H LABS


Laboratory Tests 2


10/11/17 06:00: 


Immature Granulocyte % (Auto) 0.4H, White Blood Count 7.5, Red Blood Count 3.95L

, Hemoglobin 11.0L, Hematocrit 34.5L, Mean Corpuscular Volume 87.3, Mean 

Corpuscular Hemoglobin 27.8, Mean Corpuscular Hemoglobin Concent 31.9L, Red 

Cell Distribution Width 17.1H, Platelet Count 225, Neutrophils (%) (Auto) 65.9, 

Lymphocytes (%) (Auto) 21.8L, Monocytes (%) (Auto) 8.4H, Eosinophils (%) (Auto) 

2.8, Basophils (%) (Auto) 0.7, Neutrophils # (Auto) 5.0, Lymphocytes # (Auto) 

1.6, Monocytes # (Auto) 0.6, Eosinophils # (Auto) 0.2, Basophils # (Auto) 0.1, 

Immature Granulocyte # (Auto) 0.0, Nucleated Red Blood Cells % (auto) 0.0, 

Anion Gap 8, Glomerular Filtration Rate > 60.0, Blood Urea Nitrogen 9, 

Creatinine 0.60, Sodium Level 136, Potassium Level 4.1, Chloride Level 103, 

Carbon Dioxide Level 25, Calcium Level 9.2, Magnesium Level 2.7H


10/11/17 08:55: Vancomycin Level Trough 14.9


CBC/BMP


Laboratory Tests


10/11/17 06:00








Red Blood Count 3.95 L, Mean Corpuscular Volume 87.3, Mean Corpuscular 

Hemoglobin 27.8, Mean Corpuscular Hemoglobin Concent 31.9 L, Red Cell 

Distribution Width 17.1 H, Neutrophils (%) (Auto) 65.9, Lymphocytes (%) (Auto) 

21.8 L, Monocytes (%) (Auto) 8.4 H, Eosinophils (%) (Auto) 2.8, Basophils (%) (

Auto) 0.7, Neutrophils # (Auto) 5.0, Lymphocytes # (Auto) 1.6, Monocytes # (Auto

) 0.6, Eosinophils # (Auto) 0.2, Basophils # (Auto) 0.1, Calcium Level 9.2


Microbiology





Microbiology


10/6/17 Blood Culture - Preliminary, Resulted


          No Growth after 72 hours. All specime...


10/6/17 Gram Stain - Final, Complete


          


10/6/17 CSF Culture - Final, Complete


          


10/5/17 Gram Stain - Final, Complete


          


10/5/17 CSF Culture - Final, Complete


          


10/5/17 - Final, Complete


          


10/9/17 Catheter Tip Culture - Final, Complete











MARY LAZCANO Oct 11, 2017 14:26

## 2017-10-12 VITALS — DIASTOLIC BLOOD PRESSURE: 79 MMHG | SYSTOLIC BLOOD PRESSURE: 133 MMHG

## 2017-10-12 VITALS — DIASTOLIC BLOOD PRESSURE: 68 MMHG | SYSTOLIC BLOOD PRESSURE: 140 MMHG

## 2017-10-12 VITALS — SYSTOLIC BLOOD PRESSURE: 100 MMHG | DIASTOLIC BLOOD PRESSURE: 60 MMHG

## 2017-10-12 VITALS — SYSTOLIC BLOOD PRESSURE: 121 MMHG | DIASTOLIC BLOOD PRESSURE: 58 MMHG

## 2017-10-12 LAB
ANION GAP SERPL CALC-SCNC: 9 MEQ/L (ref 8–16)
BASOPHILS # BLD AUTO: 0 10^3/UL (ref 0–0.2)
BASOPHILS NFR BLD AUTO: 0.5 % (ref 0–1)
BUN SERPL-MCNC: 11 MG/DL (ref 7–18)
CALCIUM SERPL-MCNC: 9 MG/DL (ref 8.5–10.1)
CHLORIDE SERPL-SCNC: 105 MEQ/L (ref 98–107)
CO2 SERPL-SCNC: 23 MEQ/L (ref 21–32)
CREAT SERPL-MCNC: 0.56 MG/DL (ref 0.55–1.02)
DOPAMINE 24H UR-MRATE: 104 UG/24 HR (ref 0–510)
ENA SS-A AB SER-ACNC: <0.2 AI (ref 0–0.9)
ENA SS-B AB SER-ACNC: <0.2 AI (ref 0–0.9)
EOSINOPHIL # BLD AUTO: 0.2 10^3/UL (ref 0–0.5)
EOSINOPHIL NFR BLD AUTO: 3.1 % (ref 0–3)
EPINEPH 24H UR-MRATE: 4 UG/24 HR (ref 0–20)
ERYTHROCYTE [DISTWIDTH] IN BLOOD BY AUTOMATED COUNT: 17.1 % (ref 11.5–14.5)
GFR SERPL CREATININE-BSD FRML MDRD: > 60 ML/MIN/{1.73_M2} (ref 58–?)
GLUCOSE SERPL-MCNC: 88 MG/DL (ref 70–105)
IMM GRANULOCYTES NFR BLD: 0.3 % (ref 0–0)
LYMPHOCYTES # BLD AUTO: 1.7 10^3/UL (ref 1.5–4.5)
LYMPHOCYTES NFR BLD AUTO: 27.6 % (ref 24–44)
MAGNESIUM SERPL-MCNC: 2.7 MG/DL (ref 1.8–2.4)
MCH RBC QN AUTO: 28.3 PG (ref 27–33)
MCHC RBC AUTO-ENTMCNC: 32.1 G/DL (ref 32–36.5)
MCV RBC AUTO: 88.3 FL (ref 80–96)
METANEPH 24H UR-MRATE: 45 UG/24 HR (ref 45–290)
MONOCYTES # BLD AUTO: 0.6 10^3/UL (ref 0–0.8)
MONOCYTES NFR BLD AUTO: 9.4 % (ref 0–5)
NEUTROPHILS # BLD AUTO: 3.6 10^3/UL (ref 1.8–7.7)
NEUTROPHILS NFR BLD AUTO: 59.1 % (ref 36–66)
NOREPINEPH 24H UR-MRATE: 113 UG/24 HR (ref 0–135)
NOREPINEPH UR-MCNC: 78 UG/L
NORMETANEPHRINE 24H UR-MRATE: 502 UG/24 HR (ref 82–500)
NRBC BLD AUTO-RTO: 0 % (ref 0–0)
PLATELET # BLD AUTO: 247 10^3/UL (ref 150–450)
POTASSIUM SERPL-SCNC: 4 MEQ/L (ref 3.5–5.1)
SODIUM SERPL-SCNC: 137 MEQ/L (ref 136–145)
WBC # BLD AUTO: 6.1 10^3/UL (ref 4–10)

## 2017-10-12 RX ADMIN — SODIUM CHLORIDE, PRESERVATIVE FREE SCH ML: 5 INJECTION INTRAVENOUS at 22:44

## 2017-10-12 RX ADMIN — SODIUM CHLORIDE SCH MLS/HR: 9 INJECTION, SOLUTION INTRAVENOUS at 17:00

## 2017-10-12 RX ADMIN — DEXTROSE MONOHYDRATE SCH MLS/HR: 50 INJECTION, SOLUTION INTRAVENOUS at 10:00

## 2017-10-12 RX ADMIN — HYDROCODONE BITARTRATE AND ACETAMINOPHEN PRN TAB: 7.5; 325 TABLET ORAL at 09:02

## 2017-10-12 RX ADMIN — POTASSIUM CHLORIDE SCH MEQ: 750 TABLET, EXTENDED RELEASE ORAL at 08:58

## 2017-10-12 RX ADMIN — SODIUM CHLORIDE, PRESERVATIVE FREE SCH ML: 5 INJECTION INTRAVENOUS at 04:33

## 2017-10-12 RX ADMIN — HYDROCODONE BITARTRATE AND ACETAMINOPHEN PRN TAB: 7.5; 325 TABLET ORAL at 05:01

## 2017-10-12 RX ADMIN — SODIUM CHLORIDE, PRESERVATIVE FREE SCH ML: 5 INJECTION INTRAVENOUS at 14:00

## 2017-10-12 RX ADMIN — ZONISAMIDE SCH MG: 100 CAPSULE ORAL at 09:00

## 2017-10-12 RX ADMIN — HYDROCODONE BITARTRATE AND ACETAMINOPHEN PRN TAB: 7.5; 325 TABLET ORAL at 17:16

## 2017-10-12 RX ADMIN — SODIUM CHLORIDE SCH MLS/HR: 9 INJECTION, SOLUTION INTRAVENOUS at 09:30

## 2017-10-12 RX ADMIN — HYDROCODONE BITARTRATE AND ACETAMINOPHEN PRN TAB: 7.5; 325 TABLET ORAL at 22:26

## 2017-10-12 RX ADMIN — DEXTROSE MONOHYDRATE SCH MG: 50 INJECTION, SOLUTION INTRAVENOUS at 09:00

## 2017-10-12 RX ADMIN — HYDROCODONE BITARTRATE AND ACETAMINOPHEN PRN TAB: 7.5; 325 TABLET ORAL at 12:58

## 2017-10-12 RX ADMIN — POTASSIUM CHLORIDE SCH MEQ: 750 TABLET, EXTENDED RELEASE ORAL at 20:36

## 2017-10-12 RX ADMIN — SODIUM CHLORIDE SCH MLS/HR: 9 INJECTION, SOLUTION INTRAVENOUS at 00:57

## 2017-10-12 RX ADMIN — ACETAMINOPHEN PRN MG: 325 TABLET ORAL at 23:47

## 2017-10-12 RX ADMIN — ZONISAMIDE SCH MG: 100 CAPSULE ORAL at 20:37

## 2017-10-12 RX ADMIN — SODIUM CHLORIDE SCH MLS/HR: 9 INJECTION, SOLUTION INTRAVENOUS at 09:00

## 2017-10-12 RX ADMIN — HYDROCODONE BITARTRATE AND ACETAMINOPHEN PRN TAB: 7.5; 325 TABLET ORAL at 00:58

## 2017-10-12 NOTE — REP
CT Head without contrast

 

History:  Ventricular shunt.

 

An area of decreased attenuation is present in the right basal ganglia and

internal capsule.  This represents a resolving intraparenchymal hematoma.  An

area of decreased attenuation is present in the left frontal lobe.  This

represents  gliosis along a previous ventricular shunt tube tract.  A small

amount of pneumocephalus is present in the left lateral ventricle.  There is no

hydrocephalus.  There is no extracerebral collection.  A bur hole is present in

the left frontal bone.  The visualized sinuses are clear.

 

IMPRESSION:

 

1.  There is an evolving intraparenchymal hematoma in the right basal ganglia and

internal capsule.

 

2.  There is a small area of gliosis in the left frontal lobe along a previous

ventricular shunt tube tract.

 

3.  Small amount of pneumocephalus. There is no hydrocephalus.

 

 

Signed by

Franc Way MD 10/12/2017 10:26 A

## 2017-10-13 VITALS — SYSTOLIC BLOOD PRESSURE: 127 MMHG | DIASTOLIC BLOOD PRESSURE: 69 MMHG

## 2017-10-13 VITALS — SYSTOLIC BLOOD PRESSURE: 129 MMHG | DIASTOLIC BLOOD PRESSURE: 76 MMHG

## 2017-10-13 LAB
ANION GAP SERPL CALC-SCNC: 7 MEQ/L (ref 8–16)
BASOPHILS # BLD AUTO: 0 10^3/UL (ref 0–0.2)
BASOPHILS NFR BLD AUTO: 0.6 % (ref 0–1)
BUN SERPL-MCNC: 14 MG/DL (ref 7–18)
CALCIUM SERPL-MCNC: 9.2 MG/DL (ref 8.5–10.1)
CHLORIDE SERPL-SCNC: 107 MEQ/L (ref 98–107)
CO2 SERPL-SCNC: 24 MEQ/L (ref 21–32)
CREAT SERPL-MCNC: 0.54 MG/DL (ref 0.55–1.02)
EOSINOPHIL # BLD AUTO: 0.1 10^3/UL (ref 0–0.5)
EOSINOPHIL NFR BLD AUTO: 2.2 % (ref 0–3)
ERYTHROCYTE [DISTWIDTH] IN BLOOD BY AUTOMATED COUNT: 17 % (ref 11.5–14.5)
GFR SERPL CREATININE-BSD FRML MDRD: > 60 ML/MIN/{1.73_M2} (ref 58–?)
GLUCOSE SERPL-MCNC: 86 MG/DL (ref 70–105)
IMM GRANULOCYTES NFR BLD: 0.2 % (ref 0–0)
LYMPHOCYTES # BLD AUTO: 1.6 10^3/UL (ref 1.5–4.5)
LYMPHOCYTES NFR BLD AUTO: 24.8 % (ref 24–44)
MAGNESIUM SERPL-MCNC: 2.8 MG/DL (ref 1.8–2.4)
MCH RBC QN AUTO: 27.8 PG (ref 27–33)
MCHC RBC AUTO-ENTMCNC: 31.7 G/DL (ref 32–36.5)
MCV RBC AUTO: 87.7 FL (ref 80–96)
MONOCYTES # BLD AUTO: 0.5 10^3/UL (ref 0–0.8)
MONOCYTES NFR BLD AUTO: 7.4 % (ref 0–5)
NEUTROPHILS # BLD AUTO: 4.1 10^3/UL (ref 1.8–7.7)
NEUTROPHILS NFR BLD AUTO: 64.8 % (ref 36–66)
NRBC BLD AUTO-RTO: 0 % (ref 0–0)
PLATELET # BLD AUTO: 291 10^3/UL (ref 150–450)
POTASSIUM SERPL-SCNC: 4.1 MEQ/L (ref 3.5–5.1)
SODIUM SERPL-SCNC: 138 MEQ/L (ref 136–145)
WBC # BLD AUTO: 6.3 10^3/UL (ref 4–10)

## 2017-10-13 RX ADMIN — HYDROCODONE BITARTRATE AND ACETAMINOPHEN PRN TAB: 7.5; 325 TABLET ORAL at 11:14

## 2017-10-13 RX ADMIN — ZONISAMIDE SCH MG: 100 CAPSULE ORAL at 08:20

## 2017-10-13 RX ADMIN — DEXTROSE MONOHYDRATE SCH MG: 50 INJECTION, SOLUTION INTRAVENOUS at 08:20

## 2017-10-13 RX ADMIN — HYDROCODONE BITARTRATE AND ACETAMINOPHEN PRN TAB: 7.5; 325 TABLET ORAL at 06:27

## 2017-10-13 RX ADMIN — SODIUM CHLORIDE, PRESERVATIVE FREE SCH ML: 5 INJECTION INTRAVENOUS at 06:12

## 2017-10-13 RX ADMIN — ACETAMINOPHEN PRN MG: 325 TABLET ORAL at 09:29

## 2017-10-13 RX ADMIN — POTASSIUM CHLORIDE SCH MEQ: 750 TABLET, EXTENDED RELEASE ORAL at 08:19

## 2017-10-13 NOTE — IPNPDOC
Date Seen


The patient was seen on 10/13/17.





Progress Note


Hospitalist Progress Note





Subjective: Patient is glad to be going home





Objective:





Physical Exam:


Vitals: 


Vital Sign - Last 24 Hours








 10/12/17 10/12/17 10/12/17 10/12/17





 14:01 16:25 17:16 20:37


 


Temp 97.3   


 


Pulse 83   


 


Resp 16 18 18 18


 


B/P (MAP) 133/79 (97)   


 


Pulse Ox 99   


 


O2 Delivery Room Air   Room Air


 


    





 10/12/17 10/12/17 10/12/17 10/12/17





 22:00 22:26 22:56 23:50


 


Temp 98.8   


 


Pulse 72   


 


Resp 18 18 19 


 


B/P (MAP) 100/60 (73)   


 


Pulse Ox 99   


 


O2 Delivery Room Air Room Air  Room Air


 


    





 10/13/17 10/13/17 10/13/17 10/13/17





 03:42 04:12 06:00 06:27


 


Temp   98.8 


 


Pulse   88 


 


Resp 17 15 20 18


 


B/P (MAP)   129/76 (93) 


 


Pulse Ox   100 


 


O2 Delivery   Room Air Room Air


 


    





 10/13/17 10/13/17 10/13/17 





 08:00 08:19 11:14 


 


Pulse  67  


 


Resp   18 


 


B/P (MAP)  127/69  


 


O2 Delivery Room Air   








General: Awake, alert, lying flat on her back


HEENT: Normocephalic, atraumatic, moist mucous membranes


CV: Regular rate and rhythm


Lungs: Clear to auscultation bilaterally


Abd: Soft, nontender


Extremities: No Edema


Neuro: Alert and oriented 3





Labs and Imaging:


Laboratory Tests


10/13/17 06:22








Red Blood Count 3.67 L, Mean Corpuscular Volume 87.7, Mean Corpuscular 

Hemoglobin 27.8, Mean Corpuscular Hemoglobin Concent 31.7 L, Red Cell 

Distribution Width 17.0 H, Neutrophils (%) (Auto) 64.8, Lymphocytes (%) (Auto) 

24.8, Monocytes (%) (Auto) 7.4 H, Eosinophils (%) (Auto) 2.2, Basophils (%) (

Auto) 0.6, Neutrophils # (Auto) 4.1, Lymphocytes # (Auto) 1.6, Monocytes # (Auto

) 0.5, Eosinophils # (Auto) 0.1, Basophils # (Auto) 0.0, Calcium Level 9.2











Assessment and Plan:





41-year-old female, previously healthy, who presented to the emergency 

department with headache and was found to have a spontaneous intracranial 

hemorrhage with associated hypertensive crisis. She has been admitted by 

neurosurgery, and we have been consulted for management of her blood pressure. 

She is also being followed by neurology, infectious disease, and pain 

management.  Neurosurgery plans to discharge her today.





1. Intracranial hemorrhage: Management as per neurosurgery, her primary team.





2. Concern for possible ventriculitis: Management as per infectious disease.





3. Hypertensive crisis: The patient has no prior history of hypertension. She 

has been initiated on daily Norvasc 5 mg, which is currently controlling her 

blood pressure well. We'll continue this at this time.  She will need to follow 

up with her PCP for further monitoring.





DVT prophylaxis: As per her primary team





Thank you for the opportunity to participate in the care of this patient.





VS, I&O, 24H, [facility]


Vital Signs/I&O





Vital Signs








  Date Time  Temp Pulse Resp B/P (MAP) Pulse Ox O2 Delivery O2 Flow Rate FiO2


 


10/13/17 11:14   18     


 


10/13/17 08:19  67  127/69    


 


10/13/17 08:00      Room Air  


 


10/13/17 06:00 98.8    100   














I&O- Last 24 Hours up to 6 AM 


 


 10/14/17





 06:00


 


Intake Total 240 ml


 


Output Total 200 ml


 


Balance 40 ml











Laboratory Data


24H LABS


Laboratory Tests 2


10/13/17 06:22: 


Immature Granulocyte % (Auto) 0.2H, White Blood Count 6.3, Red Blood Count 3.67L

, Hemoglobin 10.2L, Hematocrit 32.2L, Mean Corpuscular Volume 87.7, Mean 

Corpuscular Hemoglobin 27.8, Mean Corpuscular Hemoglobin Concent 31.7L, Red 

Cell Distribution Width 17.0H, Platelet Count 291, Neutrophils (%) (Auto) 64.8, 

Lymphocytes (%) (Auto) 24.8, Monocytes (%) (Auto) 7.4H, Eosinophils (%) (Auto) 

2.2, Basophils (%) (Auto) 0.6, Neutrophils # (Auto) 4.1, Lymphocytes # (Auto) 

1.6, Monocytes # (Auto) 0.5, Eosinophils # (Auto) 0.1, Basophils # (Auto) 0.0, 

Immature Granulocyte # (Auto) 0.0, Nucleated Red Blood Cells % (auto) 0.0, 

Anion Gap 7L, Glomerular Filtration Rate > 60.0, Blood Urea Nitrogen 14, 

Creatinine 0.54L, Sodium Level 138, Potassium Level 4.1, Chloride Level 107, 

Carbon Dioxide Level 24, Calcium Level 9.2, Magnesium Level 2.8H, C-Reactive 

Protein, Quantitative 0.45H


CBC/BMP


Laboratory Tests


10/13/17 06:22








Red Blood Count 3.67 L, Mean Corpuscular Volume 87.7, Mean Corpuscular 

Hemoglobin 27.8, Mean Corpuscular Hemoglobin Concent 31.7 L, Red Cell 

Distribution Width 17.0 H, Neutrophils (%) (Auto) 64.8, Lymphocytes (%) (Auto) 

24.8, Monocytes (%) (Auto) 7.4 H, Eosinophils (%) (Auto) 2.2, Basophils (%) (

Auto) 0.6, Neutrophils # (Auto) 4.1, Lymphocytes # (Auto) 1.6, Monocytes # (Auto

) 0.5, Eosinophils # (Auto) 0.1, Basophils # (Auto) 0.0, Calcium Level 9.2


Microbiology





Microbiology


10/6/17 Blood Culture - Final, Complete


          NO GROWTH AFTER 5 DAYS


10/6/17 Gram Stain - Final, Complete


          


10/6/17 CSF Culture - Final, Complete


          


10/5/17 Gram Stain - Final, Complete


          


10/5/17 CSF Culture - Final, Complete


          


10/5/17 - Final, Complete


          


10/9/17 Catheter Tip Culture - Final, Complete











MARY LAZCANO Oct 13, 2017 13:05

## 2017-10-19 ENCOUNTER — HOSPITAL ENCOUNTER (EMERGENCY)
Dept: HOSPITAL 53 - M ED | Age: 42
Discharge: HOME | End: 2017-10-19
Payer: COMMERCIAL

## 2017-10-19 VITALS
SYSTOLIC BLOOD PRESSURE: 155 MMHG | WEIGHT: 150.31 LBS | BODY MASS INDEX: 25.04 KG/M2 | HEIGHT: 65 IN | DIASTOLIC BLOOD PRESSURE: 92 MMHG

## 2017-10-19 DIAGNOSIS — Z98.890: ICD-10-CM

## 2017-10-19 DIAGNOSIS — F17.210: ICD-10-CM

## 2017-10-19 DIAGNOSIS — R51: Primary | ICD-10-CM

## 2017-10-19 DIAGNOSIS — I10: ICD-10-CM

## 2017-10-19 DIAGNOSIS — Z79.899: ICD-10-CM

## 2017-10-19 DIAGNOSIS — Z86.79: ICD-10-CM

## 2017-10-19 LAB
ANION GAP SERPL CALC-SCNC: 8 MEQ/L (ref 8–16)
BASOPHILS # BLD AUTO: 0 10^3/UL (ref 0–0.2)
BASOPHILS NFR BLD AUTO: 0.5 % (ref 0–1)
BUN SERPL-MCNC: 10 MG/DL (ref 7–18)
CALCIUM SERPL-MCNC: 8.8 MG/DL (ref 8.5–10.1)
CHLORIDE SERPL-SCNC: 106 MEQ/L (ref 98–107)
CO2 SERPL-SCNC: 26 MEQ/L (ref 21–32)
CREAT SERPL-MCNC: 0.62 MG/DL (ref 0.55–1.02)
EOSINOPHIL # BLD AUTO: 0.2 10^3/UL (ref 0–0.5)
EOSINOPHIL NFR BLD AUTO: 2.9 % (ref 0–3)
ERYTHROCYTE [DISTWIDTH] IN BLOOD BY AUTOMATED COUNT: 16.9 % (ref 11.5–14.5)
GFR SERPL CREATININE-BSD FRML MDRD: > 60 ML/MIN/{1.73_M2} (ref 58–?)
GLUCOSE SERPL-MCNC: 95 MG/DL (ref 70–105)
IMM GRANULOCYTES NFR BLD: 0.4 % (ref 0–0)
INR PPP: 0.92
LYMPHOCYTES # BLD AUTO: 2 10^3/UL (ref 1.5–4.5)
LYMPHOCYTES NFR BLD AUTO: 25.7 % (ref 24–44)
MCH RBC QN AUTO: 28.2 PG (ref 27–33)
MCHC RBC AUTO-ENTMCNC: 31.8 G/DL (ref 32–36.5)
MCV RBC AUTO: 88.7 FL (ref 80–96)
MONOCYTES # BLD AUTO: 0.5 10^3/UL (ref 0–0.8)
MONOCYTES NFR BLD AUTO: 6.3 % (ref 0–5)
NEUTROPHILS # BLD AUTO: 5 10^3/UL (ref 1.8–7.7)
NEUTROPHILS NFR BLD AUTO: 64.2 % (ref 36–66)
NRBC BLD AUTO-RTO: 0 % (ref 0–0)
PLATELET # BLD AUTO: 469 10^3/UL (ref 150–450)
POTASSIUM SERPL-SCNC: 3.2 MEQ/L (ref 3.5–5.1)
SODIUM SERPL-SCNC: 140 MEQ/L (ref 136–145)
WBC # BLD AUTO: 7.8 10^3/UL (ref 4–10)

## 2017-10-19 PROCEDURE — 86850 RBC ANTIBODY SCREEN: CPT

## 2017-10-19 PROCEDURE — 86901 BLOOD TYPING SEROLOGIC RH(D): CPT

## 2017-10-19 PROCEDURE — 86900 BLOOD TYPING SEROLOGIC ABO: CPT

## 2017-10-19 PROCEDURE — 99284 EMERGENCY DEPT VISIT MOD MDM: CPT

## 2017-10-19 PROCEDURE — 85025 COMPLETE CBC W/AUTO DIFF WBC: CPT

## 2017-10-19 PROCEDURE — 93041 RHYTHM ECG TRACING: CPT

## 2017-10-19 PROCEDURE — 96374 THER/PROPH/DIAG INJ IV PUSH: CPT

## 2017-10-19 PROCEDURE — 80048 BASIC METABOLIC PNL TOTAL CA: CPT

## 2017-10-19 PROCEDURE — 85730 THROMBOPLASTIN TIME PARTIAL: CPT

## 2017-10-19 PROCEDURE — 70450 CT HEAD/BRAIN W/O DYE: CPT

## 2017-10-19 PROCEDURE — 85610 PROTHROMBIN TIME: CPT

## 2017-10-19 PROCEDURE — 94760 N-INVAS EAR/PLS OXIMETRY 1: CPT

## 2017-10-19 PROCEDURE — 93005 ELECTROCARDIOGRAM TRACING: CPT

## 2017-10-19 NOTE — REPUSA
CLINICAL HISTORY: Headaches.

TECHNIQUE: Multiple axial brain CT scan sections were obtained from base to vertex without contrast a
dministration.

COMMENTS: Correlation is made with 10/9/17 study.

Left frontal heather hole is noted.  Shunt has been removed since the prior study.  Underlying focal lef
t frontal area of encephalomalacia is seen

The study shows normal configuration of sella turcica. There are no intra or extra-axial collections.
 There is no mass effect or midline shift. There is no evidence of hematoma formation. No hydrocephal
us is present. No abnormal calcifications are noted.

No significant abnormalities are seen either in the posterior fossa or supratentorial compartment.

The sinuses and mastoid air cells are patent.

IMPRESSION:

No evidence of acute intracranial pathology. 

Left frontal heather hole is noted.  Shunt has been removed since the prior study.  Underlying focal lef
t frontal area of encephalomalacia is seen

Thank you for your kind referral of this patient.

     Electronically signed by KRISTAN BENSON MD on 10/19/2017 07:18:21 PM ET

## 2017-10-20 NOTE — ECGEPIP
Stationary ECG Study

                           [facility] - ED

                                       

                                       Test Date:    2017-10-19

Pat Name:     NEELA NARVAEZ         Department:   

Patient ID:   [id number]                 Room:         -

Gender:       F                        Technician:   

:          [id number]               Requested By: JEFFY BARRON

Order Number: [id number]     Reading MD:   Endy Campa

                                 Measurements

Intervals                              Axis          

Rate:         72                       P:            124

PA:           166                      QRS:          127

QRSD:         82                       T:            131

QT:           394                                    

QTc:          433                                    

                           Interpretive Statements

SINUS RHYTHM

ARM LEADS REVERSED

 

 

Electronically Signed On 10- 7:11:25 EDT by Endy Campa

## 2017-10-21 NOTE — RO
DATE OF PROCEDURE:  10/07/2017

 

PREPROCEDURE DIAGNOSIS:  Intractable headaches, occipital neuralgia, 
subarachnoid

hemorrhage.

 

POSTPROCEDURE DIAGNOSIS:  Intractable headaches, occipital neuralgia,

subarachnoid hemorrhage.

 

PROCEDURE:  Left occiput block.

 

SURGEON:  Dr. Rigoberto Real

 

ASSISTANT: NONE

 

ANESTHESIA: local (occipital Block) 

 

FINDINGS:  Please see my recent hospital progress notes and consults.

 

The patient had recent onset of intracerebral hemorrhage with extravasation into

ventricle system and had noticed severe onset of pain and had gradually 
developed

neck pain radiating down to her shoulder and a shooting pain from the neck up

towards the occiput vertically, the temples, most to the left, which is a 
further

component of her continued diffuse headaches.

 

On examination, she did have significant dysesthesia along the left at C2-3.

Various options of management were discussed with her, and she understood that

she had multifactorial headaches and not any one single procedure would be able

to control her headaches. Thus, she requested and opted for a trial of left

occipital nerve block, though she understood the salvage nature of the 
procedure.

I obtained informed consent, and after all matters pertaining to the procedure,

at their request, the procedure was performed.

 

DESCRIPTION OF PROCEDURE:  The area of surgery was prepped in a sterile fashion.

The point of entry was chosen lateral at the superior nuchal line lateral to the

lateral border of the trapezius. A 27-gauge needle was attached to a 3 mL 
syringe

with 1% 3 mL lidocaine was used and the needle was pointed towards the C2 
lateral

mass. The greater occipital nerve was reached as suggested by reproduction and

worsening of her occipital neuralgia. At this time, no cerebrospinal fluid (CSF)

or blood return was noticed and this area was now infiltrated with 3 mL of 1%

lidocaine. Patient reported immediate and complete resolution of her headache,

which she was cautioned of the temporary nature of the procedure. She once again

understood that she has multifactorial headaches. The patient had followup

instructions. Blood loss was negligible. A sterile Band-Aid was applied at the

site of the procedure.

RUMA

## 2017-11-03 NOTE — DSES
DATE OF ADMISSION:  10/03/2017

DATE OF DISCHARGE:  10/13/2017

 

FINAL DIAGNOSIS:

Right basal ganglia hemorrhage with intraventricular hemorrhage.

 

HOSPITAL COURSE: I was asked to do the discharge summary, as I made rounds on her

while covering for Dr. Han. Please see his initial consultation and his

progress note for details. When I had seen her she had already undergone a left

frontal ventriculostomy for drainage of the intraventricular hemorrhage.

Throughout the course of my making rounds on her, she remained stable and

eventually her headache resolved. She had an occiput block, as she did have

chronic occipital neuralgia, which got aggravated during current admission.

Patient was discharged with followup instructions with Dr. Han office and

also was advised to continue followup with all her treating physicians.

## 2017-11-28 ENCOUNTER — HOSPITAL ENCOUNTER (OUTPATIENT)
Dept: HOSPITAL 53 - M PAIN | Age: 42
End: 2017-11-28
Attending: NURSE PRACTITIONER
Payer: COMMERCIAL

## 2017-11-28 DIAGNOSIS — Z87.891: ICD-10-CM

## 2017-11-28 DIAGNOSIS — I10: ICD-10-CM

## 2017-11-28 DIAGNOSIS — Z79.899: ICD-10-CM

## 2017-11-28 DIAGNOSIS — G43.701: Primary | ICD-10-CM

## 2017-11-28 DIAGNOSIS — Z86.73: ICD-10-CM

## 2017-12-18 NOTE — ECWPNPC
PATIENT NAME: NEELA NARVAEZ

: 1975

GENDER: FEMALE

MRN: [id number]

VISIT DATE: 2017

DISCHARGE DATE: 17 1240

VISIT LOCKED DATE TIME: 

PHYSICIAN: LALIT SILVA 

RESOURCE: LALIT SILVA 

 

           

           

REASON FOR APPOINTMENT

           

          1. HA, NECK

           

HISTORY OF PRESENT ILLNESS

           

      NEW PATIENT CONSULT:

      WHEN DID YOUR PAIN FIRST START?

          _____.

           

      BRIEFLY DESCRIBE HOW YOUR PAIN STARTED?

          ______.

           

      HOW DOES YOUR PAIN CHANGE WITH TIME?

          _______.

           

      DOES YOUR PAIN AWAKEN YOU FROM SLEEP?

          _____.

           

      HOW MANY HOURS OF SLEEP DO YOU NORMALLY GET?

          ______.

           

      ANY DIAGNOSTIC TESTING?

          _____.

           

      FACILITY WHERE TESTS WERE DONE?

          ____.

           

      PAIN TREATMENT

           

           

          TREATMENT YES

           

      CANCER

           

           

          HAVE YOU EVER HAD ANY TYPE OF CANCER?NO

           

           

          NO.

           

      PAIN SCREENING:

      PATIENT HAS A COMPLAINT OF ACUTE OR CHRONIC PAIN

           

           

          :YES

           

      FALL RISK SCREENING:

      SCREENING

           

           

          :NO FALLS IN THE PAST YEAR

           

      BECK INVENTORY:

      QUESTIONNAIRE

           

           

          ASSESSEDYES

           

      SCORE

           

           

          VALUE CALCULATED YES

          SCORE: 5./63 DENIES SUICIDAL IDEATION

           

      TODAY'S VISIT:

      NOTES:

          REFERRED BY FLORENCIO NGUYEN PA-C FOR NECK AND HEADACHE PAIN.

          REPORTS HAD ONSET OF LEFT SIDED HEADACHE 10/1/17 MONTH AGO.

          STARTING AT THE LEFT FOREHEAD AND THIS RAPIDLY SPEAD TO THE BACK

          OF THE NECK. NAUSEA STARTED WITHIN AN HOR OF THE HEADACHE. SAW

          URGENT CARE SEVERAL DAYS LATER. WENT TO ER WITH SEVERE NAUSEA,

          HEAD PAIN AND WAS FOUND TO HAVE ELEVATED BLOOD PRESSURE. DURING

          HOSPITAL STAY HAD BERRY HOLE LEFT PARIETAL WITH NO RELIEF. HAD

          OCCIPITAL BLOCK WHICH PROVIDED ABOUT 1 HOURS RELIEF. WAS STARTED

          ON TOPAMAX WHICH HAS DECREASED THE FREQ AND INTENSINTY. IS USING

          FIOICET FOR INTERMITTANT HEADACHE WHICH CAN HELP. HEADACHES ARE

          ASSOCIATED WITH PH/PN. AND NAUSEA HAS HAS IMPROVED. NO VISION

          CHANGES PRIOR TO HEADACHE. NO NEW ONSET SEIZURES. NO N/T/W IN

          EXTREMITIES. 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

           

CURRENT MEDICATIONS

           

          TAKING HYDROCODONE-ACETAMINOPHEN 7.5-325 MG TABLET 1 TABLET AS

          NEEDED ORALLY 2-3 TIMES A DAY AS NEEDED

          TAKING FIORINAL -40 MG CAPSULE 1 CAPSULE AS NEEDED ORALLY

          TWICE A DAY AS NEEDED

          TAKING NORVASC 10 MG TABLET 1 TABLET ORALLY ONCE A DAY

          TAKING LISINOPRIL 5 MG TABLET 1 TABLET ORALLY ONCE A DAY

          TAKING TOPAMAX 50 MG TABLET 1 TABLET ORALLY TWICE A DAY

          TAKING TIZANIDINE HCL 4 MG TABLET 1 TABLET AS NEEDED ORALLY THREE

          TIMES A DAY

          MEDICATION LIST REVIEWED AND RECONCILED WITH THE PATIENT

           

PAST MEDICAL HISTORY

           

          HTN

          BRAIN BLEED / STROKE (10/2017)

           

ALLERGIES

           

          N.K.D.A.

           

SURGICAL HISTORY

           

          CRANIOTOMY WITH DRAINAGE TUBE 10/2017

           

FAMILY HISTORY

           

          FATHER: ALIVE, DIAGNOSED WITH DIABETES, HYPERTENSION

          MOTHER: ALIVE, DIAGNOSED WITH HYPERTENSION

          2 BROTHER(S) , 4 SISTER(S) - HEALTHY. 2 SON(S) , 2 DAUGHTER(S) -

          HEALTHY.

           

SOCIAL HISTORY

           

          GENERAL:

           

          TOBACCO USE

          ARE YOU A:FORMER SMOKER

          HOW LONG HAS IT BEEN SINCE YOU LAST SMOKED?1-3 MONTHS

           

           

          ALCOHOL SCREENING

          POINTS4

          INTERPRETATIONPOSITIVE

           

           

          RECREATIONAL DRUG USE

          DRUG USE?NO

           

           

          Anglican

          [id number] NONE

           

           

          LANGUAGE

          LANGUAGES SPOKEN:ENGLISH

           

           

          LEARNING BARRIERS / SPECIAL NEEDS

          BARRIERS TO LEARNING?NO

          HEARING IMPAIRED?NO

          VISION IMPAIRED?YES

          :CORRECTIVE LENSES

          COGNITIVELY IMPAIRED?NO

          READINESS TO LEARN?YES

          LEARNING PREFERENCES?NO

          LEARNING CAPABILITIES PRESENT?YES

          EMOTIONAL BARRIERS?NO

          SPECIAL DEVICES?NO

           NEEDED?NO

           

           

          PAIN CLINIC PFS, CLERGY, PUBLIC HEALTH REFERRALS

          PFS REFERRAL NEEDED?NO

          CLERGY REFERRAL NEEDED?NO

          PUBLIC HEALTH REFERRAL NEEDED?NO

          WAS THE PROVIDER NOTIFIED OF ANY PERTINENT INFO?NO

          HAS THE PATIENT BEEN EDUCATED REGARDING HIS/HER PLAN OF CARE?YES

          HAS THE PATIENT BEEN EDUCATED REGARDING PAIN, THE RISK FOR PAIN,

          THE IMPORTANCE OF EFFECTIVE PAIN MANAGEMENT, AND THE PAIN

          ASSESSMENT PROCESS?YES

           

           

          PATIENT: ____.

           

           

          ADVANCE DIRECTIVES

          HEALTH CARE PROXY?NO

          WOULD YOU LIKE MORE INFORMATION?NO

          DO YOU HAVE A DNR?NO

          WOULD YOU LIKE MORE INFORMATION?NO

          LIVING WILL?NO

          WOULD YOU LIKE MORE INFORMATION?NO

          POWER OF ?NO

          WOULD YOU LIKE MORE INFORMATION?NO

           

REVIEW OF SYSTEMS

           

      REVIEWED BY:

           

          PROVIDER:    _____ .

           

      CONSTITUTIONAL:

           

          ANY CHANGE IN YOUR MEDICAL CONDITION?    NO . CHILLS    NO .

          FEVER    NO .

           

      INFECTION:

           

          DO YOU HAVE NEW INFECTIONS?    NO . DO YOU HAVE HISTORY OF MRSA? 

            NO .

           

      MUSCULOSKELETAL:

           

          ANY NEW PATTERNS OF PAIN OR NUMBNESS?    NO . SYTEMIC LUPUS    NO

          .

           

      GASTROENTEROLOGY:

           

          ANY NEW CHANGE IN BOWEL CONTROL?    NO . BARRETTS ESOPHAGUS    NO

          . CIRRHOSIS    NO . HEPATITIS    NO . LIVER FAILURE    NO . ACID

          REFLUX    NO . UNEXPLAINED WEIGHT LOSS    NO .

           

      GENITOURINARY:

           

          ANY NEW CHANGE IN BLADDER CONTROL?    NO . IS THERE A CHANCE YOU

          COULD BE PREGNANT?    NO .

           

      HEMATOLOGY/LYMPH:

           

          DO YOU TAKE ANY BLOOD THINNERS? (FOR EXAMPLE- COUMADIN, PLAVIX,

          AGGRENOX, PLATEL, PRADAXA, OR XARELTO)    NO . WHEN WAS YOUR LAST

          DOSE?    DATE: TIME:  . LOW PLATELET COUNT    NO . SICKLE CELL

          DISEASE    NO . VON WILLIEBRANDS    NO . FACTOR V LEIDEN    NO .

          THALLASEMIA    NO . ANEMIA    NO . EASY BRUISING    NO .

           

      NEUROLOGY:

           

          HAVE YOU FALLEN IN THE PAST 6 MONTHS?    NO . ANY NEW EXTREMITY

          NUMBNESS OR WEAKNESS?    NO . HEAD INJURY    NO . DEMENTIA    NO

          . CEREBRAL PALSY    NO . MULTIPLE SCLEROSIS    NO . DIZZINESS   

          NO . HEADACHE    YES . STROKES    YES, BRAIN BLEED/STROKE IN

          10/2017 . VERTIGO    NO .

           

      CARDIOLOGY:

           

          DO YOU HAVE A PACEMAKER OR DEFIBRILLATOR?    NO . ANGINA    NO .

          HEART ATTACK    NO . HEART SURGERY    NO . CONGESTIVE HEART

          FAILURE/FLUID OVERLOAD    NO . CHEST PAIN    NO . HIGH BLOOD

          PRESSURE    NEWLY DIAGNOSED . IRREGULAR HEART BEAT    NO .

           

      RESPIRATORY:

           

          HAVE YOU BEEN SICK IN THE PAST WEEK?    NO . FEVER    NO . FLU

          LIKE SYMPTOMS?    NO . CPAP    NO . BYPAP    NO . ASTHMA    NO .

          EMPHYSEMA    NO . CHRONIC LUNG DISEASES    NO . SHORTNESS OF

          BREATH ON EXERTION    NO . DO YOU USE ANY TYPE OF TOBACCO (SMOKE,

          SMOKELESS, CHEW)?    NO - RECENTLY QUIT . COUGH    NO . SNORING  

           NO .

           

      INTEGUMENTARY:

           

          DO YOU HAVE ANY RASHES OR OPEN SORES?    NO .

           

      ALLERGIC/IMMUNO:

           

          ARE YOU ALLERGIC TO SHELLFISH OR IV DYE?    NO . ANY NEW

          ALLERGIES?    NO .

           

      PSYCHIATRIC:

           

          DO YOU HAVE THOUGHTS OF HURTING YOURSELF OR SOMEONE ELSE?    NO .

          ARE YOU ABUSED, NEGLECTED, OR IN AN UNSAFE ENVIRONMENT?    NO .

           

      ENDOCRINOLOGY:

           

          ARE YOU DIABETIC?    NO . THYROID DISORDER    NO .

           

      OTHER:

           

          DO YOU NEED ANY PRESCRIPTIONS?    NO . IF YES, PLEASE LIST:   

          ____ . ANY NEW PROBLEMS WITH YOUR MEDICATIONS?    NO . WHEN DID

          YOU LAST EAT?    ____ . WHEN DID YOU LAST DRINK?    ____ . WHAT

          DID YOU LAST DRINK?    ____ . NAME OF PERSON DRIVING YOU HOME?   

          ____ . DO YOU HAVE ANY OTHER QUESTIONS OR CONCERNS    NO .

           

VITAL SIGNS

           

          .0 LBS, HT 66", BMI 24.21 INDEX, /83 MM HG, HR 75

          /MIN, RR 16 /MIN, TEMP 98.1 F, OXYGEN SAT % 98%, SAFE IN ENV?

          (Y/N) YES, NA INITIALS TL 1136, REVIEWED BY: PAULETTE.

           

EXAMINATION

           

      GENERAL EXAMINATION:

          GENERAL APPEARANCE:WELL GROOMED.

           

          PSYCHALERT , ORIENTED X 3 , APPROPRIATE MOOD AND AFFECT .

           

          HEENT:NORMOCEPHALIC, NO LYMPHADENOPATHY, NO THYROMEGLY.

           

          LUNGS:CLEAR TO AUSCULTATION BILATERALLY.

           

          HEART:NORMAL S1S2, NO MURMURS, CLICK OR RUBS.

           

          MUSCULOSKELETAL:NO TENDERNESS OVER TRAPEZIUS OR OCCIPUT. NO TMJ

          TENDERNESS.

           

          SKIN:WELL HEALED INCISION LEFT PARIETAL/FRONTAL REGION OF SCALP .

           

          NEUROLOGIC EXAM:EOMS INTACT WITHOUT NYSTAGMUS. , CN'S II-XII

          GROSSLY INTACT NO DYSARTHRIA. .

           

ASSESSMENTS

           

          CHRONIC MIGRAINE WITHOUT AURA WITH STATUS MIGRAINOSUS, NOT

          INTRACTABLE - G43.701 (PRIMARY)

           

TREATMENT

           

      CHRONIC MIGRAINE WITHOUT AURA WITH STATUS

          MIGRAINOSUS, NOT INTRACTABLE

          REFILL HYDROCODONE-ACETAMINOPHEN TABLET, 7.5-325 MG, 1 TABLET AS

          NEEDED, ORALLY, Q 8-12 HRS PRN PAIN MDD=2, 30 DAY(S), 60, REFILLS

          0

          REFILL TOPAMAX TABLET, 50 MG, 1 TABLET, ORALLY, TWICE A DAY, 30

          DAY(S), 60 TABLET, REFILLS 1

          CLINICAL NOTES: RISKS AND BENEFITS OF NARCOTIC/OPIOD MEDICATIONS

          WERE REVIEWED WITH PATIENT - THIS INCLUDES BUT IS NOT LIMITED TO

          RISK OF DEPENDANCE/DEVELOPMENT OF ADDICTION, MOOD DISTURBANCE AND

          DEPRESSION, OSTEOPOROSIS, HORMONAL AND LABIDAL CHANGES,

          RESPIRATORY DEPRESSION AND DEATH. PATIENT IS ADVISED NOT TO DRIVE

          WHILE ON THESE MEDICATIONS, ISTOP REGISTRY REVIEWED AND

          DEMNOSTRATES COMPLLIANCE. (REF # [id number]) BRINGS IN MEDICATIONS

          WHICH IS APPROPRIATE FOR WHAT WAS DISPENSED. DISCUSSED THAT GOAL

          WILL BE TO WEAN AND DISCONTINUE OPIODS.

           

PROCEDURE CODES

           

           ESTABILISHED PATIENT [facility] CHARGE

           

DISPOSITION & COMMUNICATION

           

FOLLOW UP

           

          26-28 DAYS (REASON: HEADPAIN)

           

 

ELECTRONICALLY SIGNED BY ARTUR HEREDIA ON

          2017 AT 04:17 PM EST

           

           

           

 

DISCLAIMER :

THIS IS A VISIT SUMMARY EXTRACTED FROM THE [employer] CHART.

IT IS NOT A COPY OF THE [employer] PROGRESS NOTE.

RUMA

## 2017-12-29 ENCOUNTER — HOSPITAL ENCOUNTER (OUTPATIENT)
Dept: HOSPITAL 53 - M PAIN | Age: 42
End: 2017-12-29
Attending: NURSE PRACTITIONER
Payer: COMMERCIAL

## 2017-12-29 DIAGNOSIS — Z86.73: ICD-10-CM

## 2017-12-29 DIAGNOSIS — I10: ICD-10-CM

## 2017-12-29 DIAGNOSIS — Z79.891: ICD-10-CM

## 2017-12-29 DIAGNOSIS — G43.701: Primary | ICD-10-CM

## 2017-12-29 DIAGNOSIS — Z79.899: ICD-10-CM

## 2018-02-14 ENCOUNTER — HOSPITAL ENCOUNTER (OUTPATIENT)
Dept: HOSPITAL 53 - M LAB REF | Age: 43
End: 2018-02-14
Attending: PHYSICIAN ASSISTANT
Payer: COMMERCIAL

## 2018-02-14 DIAGNOSIS — Z01.419: Primary | ICD-10-CM

## 2018-02-17 LAB — HPV HYBRID CAPTURE II: (no result)

## 2018-02-21 ENCOUNTER — HOSPITAL ENCOUNTER (OUTPATIENT)
Dept: HOSPITAL 53 - M PAIN | Age: 43
End: 2018-02-21
Attending: NURSE PRACTITIONER
Payer: COMMERCIAL

## 2018-02-21 DIAGNOSIS — Z87.891: ICD-10-CM

## 2018-02-21 DIAGNOSIS — Z79.891: ICD-10-CM

## 2018-02-21 DIAGNOSIS — G43.701: Primary | ICD-10-CM

## 2018-02-21 DIAGNOSIS — Z79.899: ICD-10-CM

## 2018-02-21 DIAGNOSIS — I10: ICD-10-CM

## 2018-02-21 DIAGNOSIS — Z86.73: ICD-10-CM

## 2018-03-21 ENCOUNTER — HOSPITAL ENCOUNTER (OUTPATIENT)
Dept: HOSPITAL 53 - M PAIN | Age: 43
End: 2018-03-21
Attending: ANESTHESIOLOGY
Payer: COMMERCIAL

## 2018-03-21 DIAGNOSIS — Z87.891: ICD-10-CM

## 2018-03-21 DIAGNOSIS — Z79.899: ICD-10-CM

## 2018-03-21 DIAGNOSIS — G89.29: Primary | ICD-10-CM

## 2018-03-21 DIAGNOSIS — I10: ICD-10-CM

## 2018-03-21 DIAGNOSIS — Z86.73: ICD-10-CM

## 2018-03-21 DIAGNOSIS — Z79.891: ICD-10-CM

## 2018-03-21 DIAGNOSIS — M79.1: ICD-10-CM

## 2018-07-18 ENCOUNTER — HOSPITAL ENCOUNTER (OUTPATIENT)
Dept: HOSPITAL 53 - M PAIN | Age: 43
End: 2018-07-18
Attending: NURSE PRACTITIONER
Payer: COMMERCIAL

## 2018-07-18 DIAGNOSIS — G43.701: Primary | ICD-10-CM

## 2018-07-18 DIAGNOSIS — F17.200: ICD-10-CM

## 2018-07-18 DIAGNOSIS — I10: ICD-10-CM

## 2018-07-18 DIAGNOSIS — Z79.899: ICD-10-CM

## 2018-07-18 DIAGNOSIS — M54.81: ICD-10-CM

## 2018-07-18 DIAGNOSIS — M46.92: ICD-10-CM

## 2018-07-18 DIAGNOSIS — Z86.73: ICD-10-CM

## 2018-08-29 ENCOUNTER — HOSPITAL ENCOUNTER (OUTPATIENT)
Dept: HOSPITAL 53 - M PAIN | Age: 43
End: 2018-08-29
Attending: ANESTHESIOLOGY
Payer: COMMERCIAL

## 2018-08-29 DIAGNOSIS — Z87.891: ICD-10-CM

## 2018-08-29 DIAGNOSIS — Z79.899: ICD-10-CM

## 2018-08-29 DIAGNOSIS — G89.29: Primary | ICD-10-CM

## 2018-08-29 DIAGNOSIS — I10: ICD-10-CM

## 2018-08-29 DIAGNOSIS — M54.81: ICD-10-CM

## 2018-08-29 DIAGNOSIS — Z86.73: ICD-10-CM

## 2018-09-13 ENCOUNTER — HOSPITAL ENCOUNTER (OUTPATIENT)
Dept: HOSPITAL 53 - M SDC | Age: 43
LOS: 3 days | Discharge: HOME | End: 2018-09-16
Attending: SURGERY
Payer: COMMERCIAL

## 2018-09-13 DIAGNOSIS — K35.89: Primary | ICD-10-CM

## 2018-09-13 DIAGNOSIS — Z79.899: ICD-10-CM

## 2018-09-13 DIAGNOSIS — Z87.891: ICD-10-CM

## 2018-09-13 DIAGNOSIS — I10: ICD-10-CM

## 2018-09-13 LAB
ALBUMIN/GLOBULIN RATIO: 1.14 (ref 1–1.93)
ALBUMIN: 4 GM/DL (ref 3.2–5.2)
ALKALINE PHOSPHATASE: 94 U/L (ref 45–117)
ALT SERPL W P-5'-P-CCNC: 13 U/L (ref 12–78)
AMORPH SED URNS QL MICRO: (no result)
AMYLASE SERPL-CCNC: 22 U/L (ref 25–115)
ANION GAP: 9 MEQ/L (ref 8–16)
APPEARANCE, URINE: CLEAR
AST SERPL-CCNC: 10 U/L (ref 7–37)
BACTERIA UR QL AUTO: NEGATIVE
BASO #: 0 10^3/UL (ref 0–0.2)
BASO %: 0.2 % (ref 0–1)
BILIRUB CONJ SERPL-MCNC: 0.2 MG/DL (ref 0–0.2)
BILIRUBIN, URINE AUTO: NEGATIVE
BILIRUBIN,TOTAL: 0.8 MG/DL (ref 0.2–1)
BLOOD UREA NITROGEN: 12 MG/DL (ref 7–18)
BLOOD, URINE BLOOD: (no result)
CALCIUM LEVEL: 8.7 MG/DL (ref 8.5–10.1)
CARBON DIOXIDE LEVEL: 20 MEQ/L (ref 21–32)
CHLORIDE LEVEL: 111 MEQ/L (ref 98–107)
CREATININE FOR GFR: 0.5 MG/DL (ref 0.55–1.3)
EOS #: 0.1 10^3/UL (ref 0–0.5)
EOSINOPHIL NFR BLD AUTO: 1 % (ref 0–3)
GFR SERPL CREATININE-BSD FRML MDRD: > 60 ML/MIN/{1.73_M2} (ref 58–?)
GLUCOSE, FASTING: 109 MG/DL (ref 70–100)
GLUCOSE, URINE (UA) AUTO: NEGATIVE MG/DL
HEMATOCRIT: 38.4 % (ref 36–47)
HEMOGLOBIN: 12.1 G/DL (ref 12–15.5)
IMMATURE GRANULOCYTE %: 0.4 % (ref 0–3)
KETONE, URINE AUTO: (no result) MG/DL
LEUKOCYTE ESTERASE UR QL STRIP.AUTO: NEGATIVE
LIPASE: 77 U/L (ref 73–393)
LYMPH #: 1.2 10^3/UL (ref 1.5–4.5)
LYMPH %: 9 % (ref 24–44)
MEAN CORPUSCULAR HEMOGLOBIN: 28.8 PG (ref 27–33)
MEAN CORPUSCULAR HGB CONC: 31.5 G/DL (ref 32–36.5)
MEAN CORPUSCULAR VOLUME: 91.4 FL (ref 80–96)
MONO #: 0.9 10^3/UL (ref 0–0.8)
MONO %: 6.3 % (ref 0–5)
MUCUS, URINE: (no result)
NEUTROPHILS #: 11.3 10^3/UL (ref 1.8–7.7)
NEUTROPHILS %: 83.1 % (ref 36–66)
NITRITE, URINE AUTO: NEGATIVE
NRBC BLD AUTO-RTO: 0 % (ref 0–0)
PH,URINE: 5 UNITS (ref 5–9)
PLATELET COUNT, AUTOMATED: 344 10^3/UL (ref 150–450)
POTASSIUM SERUM: 4.4 MEQ/L (ref 3.5–5.1)
PROT UR QL STRIP.AUTO: NEGATIVE MG/DL
RBC, URINE AUTO: 2 /HPF (ref 0–3)
RED BLOOD COUNT: 4.2 10^6/UL (ref 4–5.4)
RED CELL DISTRIBUTION WIDTH: 15.9 % (ref 11.5–14.5)
SODIUM LEVEL: 140 MEQ/L (ref 136–145)
SPECIFIC GRAVITY URINE AUTO: 1.05 (ref 1–1.03)
SQUAMOUS #/AREA URNS AUTO: 0 /HPF (ref 0–6)
TOTAL PROTEIN: 7.5 GM/DL (ref 6.4–8.2)
UROBILINOGEN, URINE AUTO: 0.2 MG/DL (ref 0–2)
WBC, URINE AUTO: 1 /HPF (ref 0–3)
WHITE BLOOD COUNT: 13.6 10^3/UL (ref 4–10)

## 2018-09-13 PROCEDURE — 44970 LAPAROSCOPY APPENDECTOMY: CPT

## 2018-09-13 RX ADMIN — LISINOPRIL 1 MG: 10 TABLET ORAL at 21:18

## 2018-09-13 RX ADMIN — HYDROMORPHONE HYDROCHLORIDE 1 MG: 1 INJECTION, SOLUTION INTRAMUSCULAR; INTRAVENOUS; SUBCUTANEOUS at 13:16

## 2018-09-13 RX ADMIN — MORPHINE SULFATE 1 MG: 10 INJECTION INTRAVENOUS at 16:40

## 2018-09-13 RX ADMIN — BUPIVACAINE HYDROCHLORIDE 1 ML: 2.5 INJECTION, SOLUTION EPIDURAL; INFILTRATION; INTRACAUDAL at 17:30

## 2018-09-13 RX ADMIN — MORPHINE SULFATE 1 MG: 4 INJECTION INTRAVENOUS at 12:31

## 2018-09-13 RX ADMIN — SODIUM CHLORIDE 1 MLS/HR: 9 INJECTION, SOLUTION INTRAVENOUS at 20:00

## 2018-09-13 RX ADMIN — DEXTROSE MONOHYDRATE 1 MLS/HR: 5 INJECTION INTRAVENOUS at 20:37

## 2018-09-13 RX ADMIN — BUPIVACAINE HYDROCHLORIDE AND EPINEPHRINE BITARTRATE 1 ML: 2.5; .005 INJECTION, SOLUTION EPIDURAL; INFILTRATION; INTRACAUDAL; PERINEURAL at 18:08

## 2018-09-13 RX ADMIN — CEFTRIAXONE 1 MLS/HR: 1 INJECTION, POWDER, FOR SOLUTION INTRAMUSCULAR; INTRAVENOUS at 21:55

## 2018-09-13 RX ADMIN — SODIUM CHLORIDE, POTASSIUM CHLORIDE, SODIUM LACTATE AND CALCIUM CHLORIDE 1 MLS/HR: 600; 310; 30; 20 INJECTION, SOLUTION INTRAVENOUS at 19:00

## 2018-09-13 RX ADMIN — KETOROLAC TROMETHAMINE 1 MG: 30 INJECTION, SOLUTION INTRAMUSCULAR at 20:37

## 2018-09-13 RX ADMIN — IPRATROPIUM BROMIDE AND ALBUTEROL SULFATE 1 ML: .5; 3 SOLUTION RESPIRATORY (INHALATION) at 23:27

## 2018-09-13 RX ADMIN — MORPHINE SULFATE 1 MG: 4 INJECTION INTRAVENOUS at 11:55

## 2018-09-13 RX ADMIN — HYDROMORPHONE HYDROCHLORIDE 1 MG: 1 INJECTION, SOLUTION INTRAMUSCULAR; INTRAVENOUS; SUBCUTANEOUS at 21:55

## 2018-09-13 RX ADMIN — MORPHINE SULFATE 1 MG: 10 INJECTION INTRAVENOUS at 16:50

## 2018-09-13 RX ADMIN — DEXTROSE MONOHYDRATE 1 MLS/HR: 5 INJECTION INTRAVENOUS at 14:23

## 2018-09-13 RX ADMIN — ONDANSETRON 1 MG: 2 INJECTION INTRAMUSCULAR; INTRAVENOUS at 11:55

## 2018-09-13 RX ADMIN — HYDROMORPHONE HYDROCHLORIDE 1 MG: 1 INJECTION, SOLUTION INTRAMUSCULAR; INTRAVENOUS; SUBCUTANEOUS at 14:26

## 2018-09-13 RX ADMIN — SODIUM CHLORIDE 1 MLS/HR: 9 INJECTION, SOLUTION INTRAVENOUS at 11:55

## 2018-09-14 RX ADMIN — LISINOPRIL 1 MG: 10 TABLET ORAL at 21:00

## 2018-09-14 RX ADMIN — SODIUM CHLORIDE 1 MLS/HR: 9 INJECTION, SOLUTION INTRAVENOUS at 23:01

## 2018-09-14 RX ADMIN — METOPROLOL SUCCINATE 1 MG: 25 TABLET, EXTENDED RELEASE ORAL at 08:40

## 2018-09-14 RX ADMIN — SENNOSIDES 1 TAB: 8.6 TABLET, FILM COATED ORAL at 14:38

## 2018-09-14 RX ADMIN — KETOROLAC TROMETHAMINE 1 MG: 30 INJECTION, SOLUTION INTRAMUSCULAR at 02:39

## 2018-09-14 RX ADMIN — DOCUSATE SODIUM 1 MG: 100 CAPSULE, LIQUID FILLED ORAL at 21:07

## 2018-09-14 RX ADMIN — DEXTROSE MONOHYDRATE 1 MLS/HR: 5 INJECTION INTRAVENOUS at 08:39

## 2018-09-14 RX ADMIN — DEXTROSE MONOHYDRATE 1 MLS/HR: 5 INJECTION INTRAVENOUS at 14:14

## 2018-09-14 RX ADMIN — KETOROLAC TROMETHAMINE 1 MG: 30 INJECTION, SOLUTION INTRAMUSCULAR at 14:14

## 2018-09-14 RX ADMIN — HYDROMORPHONE HYDROCHLORIDE 1 MG: 1 INJECTION, SOLUTION INTRAMUSCULAR; INTRAVENOUS; SUBCUTANEOUS at 06:06

## 2018-09-14 RX ADMIN — PANTOPRAZOLE SODIUM 1 MG: 40 TABLET, DELAYED RELEASE ORAL at 08:40

## 2018-09-14 RX ADMIN — ONDANSETRON 1 MG: 2 INJECTION INTRAMUSCULAR; INTRAVENOUS at 14:39

## 2018-09-14 RX ADMIN — KETOROLAC TROMETHAMINE 1 MG: 30 INJECTION, SOLUTION INTRAMUSCULAR at 21:07

## 2018-09-14 RX ADMIN — SODIUM CHLORIDE 1 MLS/HR: 9 INJECTION, SOLUTION INTRAVENOUS at 14:38

## 2018-09-14 RX ADMIN — SODIUM CHLORIDE 1 MLS/HR: 9 INJECTION, SOLUTION INTRAVENOUS at 05:00

## 2018-09-14 RX ADMIN — IPRATROPIUM BROMIDE AND ALBUTEROL SULFATE 1 ML: .5; 3 SOLUTION RESPIRATORY (INHALATION) at 08:00

## 2018-09-14 RX ADMIN — DEXTROSE MONOHYDRATE 1 MLS/HR: 5 INJECTION INTRAVENOUS at 21:07

## 2018-09-14 RX ADMIN — KETOROLAC TROMETHAMINE 1 MG: 30 INJECTION, SOLUTION INTRAMUSCULAR at 08:38

## 2018-09-14 RX ADMIN — IPRATROPIUM BROMIDE AND ALBUTEROL SULFATE 1 ML: .5; 3 SOLUTION RESPIRATORY (INHALATION) at 15:07

## 2018-09-14 RX ADMIN — IPRATROPIUM BROMIDE AND ALBUTEROL SULFATE 1 ML: .5; 3 SOLUTION RESPIRATORY (INHALATION) at 19:25

## 2018-09-14 RX ADMIN — CEFTRIAXONE 1 MLS/HR: 1 INJECTION, POWDER, FOR SOLUTION INTRAMUSCULAR; INTRAVENOUS at 23:01

## 2018-09-14 RX ADMIN — DEXTROSE MONOHYDRATE 1 MLS/HR: 5 INJECTION INTRAVENOUS at 02:39

## 2018-09-14 RX ADMIN — HYDROMORPHONE HYDROCHLORIDE 1 MG: 1 INJECTION, SOLUTION INTRAMUSCULAR; INTRAVENOUS; SUBCUTANEOUS at 19:49

## 2018-09-14 RX ADMIN — DOCUSATE SODIUM 1 MG: 100 CAPSULE, LIQUID FILLED ORAL at 08:39

## 2018-09-14 RX ADMIN — HYDROMORPHONE HYDROCHLORIDE 1 MG: 1 INJECTION, SOLUTION INTRAMUSCULAR; INTRAVENOUS; SUBCUTANEOUS at 12:18

## 2018-09-14 RX ADMIN — IPRATROPIUM BROMIDE AND ALBUTEROL SULFATE 1 ML: .5; 3 SOLUTION RESPIRATORY (INHALATION) at 11:40

## 2018-09-15 RX ADMIN — DEXTROSE MONOHYDRATE 1 MLS/HR: 5 INJECTION INTRAVENOUS at 08:18

## 2018-09-15 RX ADMIN — DEXTROSE MONOHYDRATE 1 MLS/HR: 5 INJECTION INTRAVENOUS at 13:10

## 2018-09-15 RX ADMIN — IPRATROPIUM BROMIDE AND ALBUTEROL SULFATE 1 ML: .5; 3 SOLUTION RESPIRATORY (INHALATION) at 19:43

## 2018-09-15 RX ADMIN — IPRATROPIUM BROMIDE AND ALBUTEROL SULFATE 1 ML: .5; 3 SOLUTION RESPIRATORY (INHALATION) at 12:00

## 2018-09-15 RX ADMIN — PANTOPRAZOLE SODIUM 1 MG: 40 TABLET, DELAYED RELEASE ORAL at 08:17

## 2018-09-15 RX ADMIN — DEXTROSE MONOHYDRATE 1 MLS/HR: 5 INJECTION INTRAVENOUS at 20:39

## 2018-09-15 RX ADMIN — HYDROMORPHONE HYDROCHLORIDE 1 MG: 1 INJECTION, SOLUTION INTRAMUSCULAR; INTRAVENOUS; SUBCUTANEOUS at 04:19

## 2018-09-15 RX ADMIN — KETOROLAC TROMETHAMINE 1 MG: 30 INJECTION, SOLUTION INTRAMUSCULAR at 21:59

## 2018-09-15 RX ADMIN — ONDANSETRON 1 MG: 2 INJECTION INTRAMUSCULAR; INTRAVENOUS at 02:55

## 2018-09-15 RX ADMIN — LISINOPRIL 1 MG: 10 TABLET ORAL at 18:57

## 2018-09-15 RX ADMIN — HYDROMORPHONE HYDROCHLORIDE 1 MG: 1 INJECTION, SOLUTION INTRAMUSCULAR; INTRAVENOUS; SUBCUTANEOUS at 11:18

## 2018-09-15 RX ADMIN — DOCUSATE SODIUM 1 MG: 100 CAPSULE, LIQUID FILLED ORAL at 10:44

## 2018-09-15 RX ADMIN — DOCUSATE SODIUM 1 MG: 100 CAPSULE, LIQUID FILLED ORAL at 20:38

## 2018-09-15 RX ADMIN — KETOROLAC TROMETHAMINE 1 MG: 30 INJECTION, SOLUTION INTRAMUSCULAR at 08:15

## 2018-09-15 RX ADMIN — DEXTROSE MONOHYDRATE 1 MLS/HR: 5 INJECTION INTRAVENOUS at 02:42

## 2018-09-15 RX ADMIN — IPRATROPIUM BROMIDE AND ALBUTEROL SULFATE 1 ML: .5; 3 SOLUTION RESPIRATORY (INHALATION) at 15:35

## 2018-09-15 RX ADMIN — KETOROLAC TROMETHAMINE 1 MG: 30 INJECTION, SOLUTION INTRAMUSCULAR at 14:18

## 2018-09-15 RX ADMIN — KETOROLAC TROMETHAMINE 1 MG: 30 INJECTION, SOLUTION INTRAMUSCULAR at 02:43

## 2018-09-15 RX ADMIN — METOCLOPRAMIDE 1 MG: 5 INJECTION, SOLUTION INTRAMUSCULAR; INTRAVENOUS at 08:23

## 2018-09-15 RX ADMIN — METOPROLOL SUCCINATE 1 MG: 25 TABLET, EXTENDED RELEASE ORAL at 09:00

## 2018-09-15 RX ADMIN — CEFTRIAXONE 1 MLS/HR: 1 INJECTION, POWDER, FOR SOLUTION INTRAMUSCULAR; INTRAVENOUS at 21:59

## 2018-09-15 RX ADMIN — IPRATROPIUM BROMIDE AND ALBUTEROL SULFATE 1 ML: .5; 3 SOLUTION RESPIRATORY (INHALATION) at 07:51

## 2018-09-16 LAB
HEMATOCRIT: 26.1 % (ref 36–47)
HEMOGLOBIN: 8.3 G/DL (ref 12–15.5)
MEAN CORPUSCULAR HEMOGLOBIN: 29.1 PG (ref 27–33)
MEAN CORPUSCULAR HGB CONC: 31.8 G/DL (ref 32–36.5)
MEAN CORPUSCULAR VOLUME: 91.6 FL (ref 80–96)
NRBC BLD AUTO-RTO: 0 % (ref 0–0)
PLATELET COUNT, AUTOMATED: 229 10^3/UL (ref 150–450)
RED BLOOD COUNT: 2.85 10^6/UL (ref 4–5.4)
RED CELL DISTRIBUTION WIDTH: 16.4 % (ref 11.5–14.5)
WHITE BLOOD COUNT: 6.9 10^3/UL (ref 4–10)

## 2018-09-16 RX ADMIN — IPRATROPIUM BROMIDE AND ALBUTEROL SULFATE 1 ML: .5; 3 SOLUTION RESPIRATORY (INHALATION) at 08:00

## 2018-09-16 RX ADMIN — KETOROLAC TROMETHAMINE 1 MG: 30 INJECTION, SOLUTION INTRAMUSCULAR at 04:01

## 2018-09-16 RX ADMIN — DOCUSATE SODIUM 1 MG: 100 CAPSULE, LIQUID FILLED ORAL at 09:08

## 2018-09-16 RX ADMIN — ONDANSETRON 1 MG: 2 INJECTION INTRAMUSCULAR; INTRAVENOUS at 01:46

## 2018-09-16 RX ADMIN — KETOROLAC TROMETHAMINE 1 MG: 30 INJECTION, SOLUTION INTRAMUSCULAR at 09:13

## 2018-09-16 RX ADMIN — DEXTROSE MONOHYDRATE 1 MLS/HR: 5 INJECTION INTRAVENOUS at 03:27

## 2018-09-16 RX ADMIN — DEXTROSE MONOHYDRATE 1 MLS/HR: 5 INJECTION INTRAVENOUS at 09:08

## 2018-09-16 RX ADMIN — PANTOPRAZOLE SODIUM 1 MG: 40 TABLET, DELAYED RELEASE ORAL at 09:08

## 2018-09-16 RX ADMIN — AMOXICILLIN AND CLAVULANATE POTASSIUM 1 MG: 875; 125 TABLET, FILM COATED ORAL at 10:53

## 2018-10-31 ENCOUNTER — HOSPITAL ENCOUNTER (OUTPATIENT)
Dept: HOSPITAL 53 - M PAIN | Age: 43
End: 2018-10-31
Attending: NURSE PRACTITIONER
Payer: COMMERCIAL

## 2018-10-31 DIAGNOSIS — G43.701: Primary | ICD-10-CM

## 2018-10-31 DIAGNOSIS — Z87.891: ICD-10-CM

## 2018-10-31 DIAGNOSIS — I10: ICD-10-CM

## 2018-10-31 DIAGNOSIS — Z79.899: ICD-10-CM

## 2018-10-31 DIAGNOSIS — M54.81: ICD-10-CM

## 2018-10-31 DIAGNOSIS — Z86.73: ICD-10-CM

## 2019-02-20 ENCOUNTER — HOSPITAL ENCOUNTER (OUTPATIENT)
Dept: HOSPITAL 53 - M PAIN | Age: 44
End: 2019-02-20
Attending: NURSE PRACTITIONER
Payer: COMMERCIAL

## 2019-02-20 DIAGNOSIS — M47.812: Primary | ICD-10-CM

## 2019-02-20 DIAGNOSIS — Z79.899: ICD-10-CM

## 2019-02-20 DIAGNOSIS — Z86.73: ICD-10-CM

## 2019-02-20 DIAGNOSIS — I10: ICD-10-CM

## 2019-02-20 DIAGNOSIS — G89.29: ICD-10-CM

## 2019-03-05 NOTE — ECWPNPC
PATIENT NAME: NEELA NARVAEZ

: 1975

GENDER: FEMALE

MRN: [id number]

VISIT DATE: 2019

DISCHARGE DATE: 19 0954

VISIT LOCKED DATE TIME: 

PHYSICIAN: WESLEY CLEMENTS

RESOURCE: WESLEY CLEMENTS

 

           

           

REASON FOR APPOINTMENT

           

          1. NECK

           

HISTORY OF PRESENT ILLNESS

           

      HISTORY OF PRESENT ILLNESS:  HERE FOR F/U OF

          CHRONIC NECK AND HEAD PAIN.REPORTING INTERMITTENT EPISODES OF

          SEVERE RIGHT NECK PAIN THAT RADIATES TO RIGHT SHOULDER.THIS HAS

          BEEN HAPPENING SINCE DECEMBER.RATING PAIN VAS 5/10.

      PAIN

           

           

          THE PATIENT DESCRIBES THE PAIN...

           

      FALL RISK SCREENING:

      SCREENING

           

          : NO FALLS IN THE PAST YEAR.

           

CURRENT MEDICATIONS

           

          TAKING FIORINAL -40 MG CAPSULE 1 CAPSULE AS NEEDED ORALLY

          TWICE A DAY AS NEEDED

          TAKING LISINOPRIL 30 MG TABLET 1 TABLET ORALLY ONCE A DAY

          TAKING METOPROLOL TARTRATE 25 MG TABLET 1 TABLET WITH FOOD ORALLY

          ONCE A DAY

          TAKING TIZANIDINE HCL 4 MG TABLET 1 TABLET AS NEEDED ORALLY THREE

          TIMES A DAY

          TAKING HYDROCODONE-ACETAMINOPHEN 7.5-325 MG TABLET 1 TABLET AS

          NEEDED ORALLY Q 8-12 HRS PRN PAIN MDD=3

          UNKNOWN TOPAMAX 50 MG TABLET 1 TABLET ORALLY TWICE A DAY

          MEDICATION LIST REVIEWED AND RECONCILED WITH THE PATIENT

           

PAST MEDICAL HISTORY

           

          HTN

          BRAIN BLEED / STROKE (10/2017)

          HEAD AND NECK PAIN

           

ALLERGIES

           

          N.K.D.A.

           

SURGICAL HISTORY

           

          ICP DRAIN 10/2017

           

FAMILY HISTORY

           

          FATHER: ALIVE, DIAGNOSED WITH DIABETES, HYPERTENSION

          MOTHER: ALIVE, DIAGNOSED WITH HYPERTENSION

          2 BROTHER(S) , 4 SISTER(S) - HEALTHY. 2 SON(S) , 2 DAUGHTER(S) -

          HEALTHY.

           

HOSPITALIZATION/MAJOR DIAGNOSTIC PROCEDURE

           

          SURGERY

           

REVIEW OF SYSTEMS

           

      REVIEWED BY:

           

          PROVIDER:    WESLEY DUMONT .

           

      CONSTITUTIONAL:

           

          ANY CHANGE IN YOUR MEDICAL CONDITION?    NO . CHILLS    NO .

          FEVER    NO .

           

      INFECTION:

           

          DO YOU HAVE NEW INFECTIONS?    NO . DO YOU HAVE HISTORY OF MRSA? 

            NO .

           

      MUSCULOSKELETAL:

           

          ANY NEW PATTERNS OF PAIN OR NUMBNESS?    INCRASED PAIN ON RIGHT

          NECK IT WILL COME ON FAST STAY ACOUPLE DAYS AND THEN LEAVE

          QUICKLY .

           

      GASTROENTEROLOGY:

           

          ANY NEW CHANGE IN BOWEL CONTROL?    NO .

           

      GENITOURINARY:

           

          ANY NEW CHANGE IN BLADDER CONTROL?    NO . IS THERE A CHANCE YOU

          COULD BE PREGNANT?    NO .

           

      HEMATOLOGY/LYMPH:

           

          DO YOU TAKE ANY BLOOD THINNERS? (FOR EXAMPLE- COUMADIN, PLAVIX,

          AGGRENOX, PLATEL, PRADAXA, OR XARELTO)    NO . WHEN WAS YOUR LAST

          DOSE?    DATE: TIME:  .

           

      NEUROLOGY:

           

          HAVE YOU FALLEN IN THE PAST 12 MONTHS?    NO . ANY NEW EXTREMITY

          NUMBNESS OR WEAKNESS?    NO .

           

      CARDIOLOGY:

           

          DO YOU HAVE A PACEMAKER OR DEFIBRILLATOR?    NO .

           

      RESPIRATORY:

           

          HAVE YOU BEEN SICK IN THE PAST WEEK?    NO . FEVER    NO . FLU

          LIKE SYMPTOMS?    NO . COUGH    NO .

           

      INTEGUMENTARY:

           

          DO YOU HAVE ANY RASHES OR OPEN SORES?    NO .

           

      ALLERGIC/IMMUNO:

           

          ARE YOU ALLERGIC TO IV DYE?    NO . ANY NEW ALLERGIES?    NO .

           

      PSYCHIATRIC:

           

          DO YOU HAVE THOUGHTS OF HURTING YOURSELF OR SOMEONE ELSE?    NO .

          ARE YOU ABUSED, NEGLECTED, OR IN AN UNSAFE ENVIRONMENT?    NO .

           

      ENDOCRINOLOGY:

           

          ARE YOU DIABETIC?    NO .

           

      OTHER:

           

          DO YOU NEED ANY PRESCRIPTIONS?    FIORICET  . IF YES, PLEASE

          LIST:    ____ . ANY NEW PROBLEMS WITH YOUR MEDICATIONS?    NO .

          WHEN DID YOU LAST EAT?    ____ . WHEN DID YOU LAST DRINK?    ____

          . WHAT DID YOU LAST DRINK?    ____ . NAME OF PERSON DRIVING YOU

          HOME?    ____ . DO YOU HAVE ANY OTHER QUESTIONS OR CONCERNS    NO

          .

           

VITAL SIGNS

           

          .2 LBS, HT 66", BMI 25.37 INDEX, /84 MM HG, HR 73

          /MIN, RR 16 /MIN, TEMP 98.0 F, OXYGEN SAT % 99%, NA INITIALS SC

          09:09.

           

EXAMINATION

           

      GENERAL EXAMINATION:

          GENERAL APPEARANCE:AWAKE,ALERT ,PLEAASANT .

           

          PSYCHAFFECT NORMAL .

           

          LUNGS:LUNG ROBERSON ARE CLEAR TO AUSCULTATION BILATERALLY. GOOD

          MOVEMENT OF AIR .

           

          HEART:S1, S2 IN A REGULAR RATE AND RHYTHM. NO SIGNIFICANT

          MURMURS, RUBS OR GALLOPS NOTED .

           

          CERVICALTENDER OVER CERVICAL SPINE AND CERVICAL PARASPINALS. .

           

          DIAGNOSTIC TESTS REVIEWEDMRI C/SPINE- 2017.

           

ASSESSMENTS

           

          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

           

TREATMENT

           

      CERVICAL SPONDYLOSIS WITHOUT MYELOPATHY

          REFILL FIORINAL CAPSULE, -40 MG, 1 CAPSULE AS NEEDED,

          ORALLY, TWICE A DAY AS NEEDED, 30 DAY(S), 30, REFILLS 0

          REFILL HYDROCODONE-ACETAMINOPHEN TABLET, 7.5-325 MG, 1 TABLET AS

          NEEDED, ORALLY, Q 8-12 HRS PRN PAIN MDD=3, 30 DAY(S), 75, REFILLS

          0

          NOTES: MAKE APPOINTMENT WITH NEUROLOGY C5/6 CESIISTOP REGISTRY

          REVIEWED AND DEMONSTRATES COMPLLIANCE. (REF # [id number] ) BRINGS

          IN MEDICATIONS WHICH IS APPROPRIATE FOR WHAT WAS DISPENSED.

          RECENT URINE TOXICOLOGY REVIEWED. NO UNAUTHORIZED MEDICATIONS. NO

          ILLICIT SUBSTANCES AND PRESCRIBED MEDICATIONS WERE PRESENT. ,

          URINE TOX TODAYRISKS AND BENEFITS OF NARCOTIC/OPIOD MEDICATIONS

          WERE REVIEWED WITH PATIENT - THIS INCLUDES BUT IS NOT LIMITED TO

          RISK OF DEPENDANCE/DEVELOPMENT OF ADDICTION, MOOD DISTURBANCE AND

          DEPRESSION, OSTEOPOROSIS, HORMONAL AND LABIDAL CHANGES,

          RESPIRATORY DEPRESSION AND DEATH. PATIENT IS ADVISED NOT TO DRIVE

          OR DRINK ALCOHOL WHILE ON THESE MEDICATIONS, [facility] NARCOTIC AGREEMENT WAS UPDATED REVIEWED AND SIGNED TODAY

          BY THE PATIENT. SEE ATTACHED DOCUMENT FOR FULL DETAILS; SPECIFIC

          ISSUES WERE REVIEWED: 1) KEEP PAIN MEDS IN THEIR ORIGINAL BOTTLES

          AND ANY WEEKLY PLANNERS ARE TO BE BROUGHT TO THE PAIN CENTER AT

          EVERY VISIT. 2) THE PATIENT IS NOT TO INCREASE DOSING OR TIMING

          OF THEIR PAIN MEDICATION WITHOUT SPECIFIC DIRECTION OF THEIR PAIN

          CENTERPROVIDER (NOT ER OR OTHER PROVIDERS). 3) ALL PAIN MEDS ARE

          TO BE KEPT SECURED, IN A LOCKED BOX. 4) NO PAIN MEDS ARE TO BE

          SHARED WITH ANY OTHER PERSON FOR ANY REASON. 5) NO PAIN MEDS MAY

          BE TAKEN FROM ANY FRIENDS OR RELATIVES FOR ANY REASON 6) NO MEDS

          OR SUBSTANCES WHICH ARE NOT LEGAL ARE TO BE USED- NO MARIJUANA,

          NO COCAINE, AMPHETAMINES, HEROIN, OR OTHERS ARE EVER TO BE USED.

          7)URINE TESTING IS DONE TO ACCOUNT FOR MEDS AND SUBSTANCES BEING

          TAKEN AND WILL BE DONE RANDOMLY.,.

           

PREVENTIVE MEDICINE

           

      PAIN CLINIC TEACHING:

           

          PROCEDURE TEACHING   WENT OVER CERVICAL EPIDURAL PROCEDURE WITH

          PT ANSWERED HER QUESTIONS.

           

PROCEDURE CODES

           

           ESTABILISHED PATIENT [facility] CHARGE

           

DISPOSITION & COMMUNICATION

           

FOLLOW UP

           

          POST (REASON: C5/6 ADA)

           

 

ELECTRONICALLY SIGNED BY TIM NORRIS ON

          2019 AT 01:25 PM EST

           

           

           

 

DISCLAIMER :

THIS IS A VISIT SUMMARY EXTRACTED FROM THE [employer] CHART.

IT IS NOT A COPY OF THE [employer] PROGRESS NOTE.

RUMA

## 2019-08-22 ENCOUNTER — HOSPITAL ENCOUNTER (OUTPATIENT)
Dept: HOSPITAL 53 - M PAIN | Age: 44
End: 2019-08-22
Attending: NURSE PRACTITIONER
Payer: COMMERCIAL

## 2019-08-22 DIAGNOSIS — M47.812: Primary | ICD-10-CM

## 2019-08-22 DIAGNOSIS — Z79.899: ICD-10-CM

## 2019-08-22 DIAGNOSIS — I10: ICD-10-CM

## 2019-08-22 DIAGNOSIS — F17.210: ICD-10-CM

## 2019-08-23 NOTE — ECWPNPC
PATIENT NAME: NEELA NARVAEZ

: 1975

GENDER: FEMALE

MRN: [id number]

VISIT DATE: 2019

DISCHARGE DATE: 19 0957

VISIT LOCKED DATE TIME: 

PHYSICIAN: NADER PENG 

RESOURCE: NADER PENG 

 

           

           

REASON FOR APPOINTMENT

           

          1. POST ADA

           

HISTORY OF PRESENT ILLNESS

           

      HISTORY OF PRESENT ILLNESS:

      PAIN

           

           

          THE PATIENT DESCRIBES THE PAIN...

           

           43 YEAR OLD FEMALE IN FOR POST ADA FOLLOW UP. SHE FEELS THE

          PROCEDURE DID NOT PROVIDE HER WITH ANY RELIEF. SHE RATES HER PAIN

          AT A 4/10 CURRENTLY AND DESCRIBES IT AS ACHING, AND SHARP.

           

      FALL RISK SCREENING:

      SCREENING

           

           

          :NO FALLS REPORTED IN THE LAST YEAR

           

CURRENT MEDICATIONS

           

          TAKING LISINOPRIL 30 MG TABLET 1 TABLET ORALLY ONCE A DAY

          TAKING METOPROLOL TARTRATE 25 MG TABLET 1 TABLET WITH FOOD ORALLY

          ONCE A DAY

          TAKING HYDROCODONE-ACETAMINOPHEN 7.5-325 MG TABLET 1 TABLET AS

          NEEDED ORALLY Q 8-12 HRS PRN PAIN MDD=3

          TAKING FIORINAL -40 MG CAPSULE 1 CAPSULE AS NEEDED ORALLY

          TWICE A DAY AS NEEDED

          NOT-TAKING TIZANIDINE HCL 4 MG TABLET 1 TABLET AS NEEDED ORALLY

          THREE TIMES A DAY

          NOT-TAKING TOPAMAX 50 MG TABLET 1 TABLET ORALLY TWICE A DAY

          MEDICATION LIST REVIEWED AND RECONCILED WITH THE PATIENT

           

PAST MEDICAL HISTORY

           

          HTN

          BRAIN BLEED / STROKE (10/2017)

          HEAD AND NECK PAIN

          APPENDICITIS

           

ALLERGIES

           

          N.K.D.A.

           

SURGICAL HISTORY

           

          ICP DRAIN 10/2017

           

FAMILY HISTORY

           

          FATHER: ALIVE, DIAGNOSED WITH DIABETES, HYPERTENSION

          MOTHER: ALIVE, HYPERTENSION

          2 BROTHER(S) , 4 SISTER(S) - HEALTHY. 2 SON(S) , 2 DAUGHTER(S) -

          HEALTHY.

           

SOCIAL HISTORY

           

          GENERAL:

           

          TOBACCO USE

          ARE YOU A:CURRENT SMOKER

          ARE YOU INTERESTED IN QUITTING?NOT READY TO QUIT

          COUNSELED THE PATIENT ON SMOKING EFFECTS, EDUCATION

          [id number]/22/2019

          HOW MANY CIGARETTES A DAY DO YOU SMOKE?6-10

          HOW SOON AFTER YOU WAKE UP DO YOU SMOKE YOUR FIRST CIGARETTE?6-30

          MIN

          HOW OFTEN DO YOU SMOKE CIGARETTES?EVERY DAY

          PATIENT COUNSELED ON THE DANGERS OF TOBACCO USE AND URGED TO

          QUIT:2019

          VAPORNO

          E-CIGARETTENO

           

           

          EDUCATION

          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

           

           

          LANGUAGE

          LANGUAGES SPOKEN:ENGLISH

           

           

          DOMESTIC VIOLENCE

          DO YOU FEEL SAFE IN YOUR ENVIRONMENT?YES

           

           

          RECREATIONAL DRUG USE

          DRUG USE?NO

           

           

          LEARNING BARRIERS / SPECIAL NEEDS

          BARRIERS TO LEARNING?NO

          HEARING IMPAIRED?NO

          VISION IMPAIRED?YES

          :CORRECTIVE LENSES

          COGNITIVELY IMPAIRED?NO

          READINESS TO LEARN?YES

          LEARNING PREFERENCES?NO

          LEARNING CAPABILITIES PRESENT?YES

          EMOTIONAL BARRIERS?NO

          SPECIAL DEVICES?NO

           NEEDED?NO

           

           

          PAIN CLINIC PFS, CLERGY, PUBLIC HEALTH REFERRALS

          PFS REFERRAL NEEDED?NO

          CLERGY REFERRAL NEEDED?NO

          PUBLIC HEALTH REFERRAL NEEDED?NO

          WAS THE PROVIDER NOTIFIED OF ANY PERTINENT INFO? N/A

          HAS THE PATIENT BEEN EDUCATED REGARDING HIS/HER PLAN OF CARE?YES

          HAS THE PATIENT BEEN EDUCATED REGARDING PAIN, THE RISK FOR PAIN,

          THE IMPORTANCE OF EFFECTIVE PAIN MANAGEMENT, AND THE PAIN

          ASSESSMENT PROCESS?YES

           

           

          LATEX QUESTIONNAIRE

          LATEX ALLERGY : HAVE YOU EVER DEVELOPED ANY TYPE OF REACTION

          AFTER HANDLING LATEX PRODUCTS SUCH AS RUBBER GLOVES, CONDOMS,

          DIAPHRAGMS, BALLOONS, SOCKS, OR UNDERWEAR?NO

          LATEX ALLERGY : HAVE YOU EVER DEVELOPED ANY TYPE OF REACTION

          DURING OR AFTER DENTAL APPOINTMENT, VAGINAL/RECTAL EXAMINATION,

          SURGICAL PROCEDURE, OR ANY OTHER EXPOSURE?NO

          LATEX RISK : HAVE YOU EVER HAD ANY DIFFICULTY BREATHING OR HIVES

          AFTER EATING OR HANDLING ANY FRUITS, OR VEGETABLES; SUCH AS KIWI,

          BANANAS, STONE FRUITS, OR CHESTNUTSNO

          LATEX RISK : DO YOU HAVE A PREVIOUS PERSONAL HISTORY OF MORE THAN

          NINE SURGERIES, SPINA BIFIDA, OR REPEATED CATHERIZATIONS? NO

          LATEX RISK : ARE YOU FREQUENTLY EXPOSED TO LATEX PRODUCTS IN YOUR

          OCCUPATION?NO

          DATE ASKED : 2019

           

           

          CAFFEINE

          CAFFEINE USE?YES

          HOW OFTEN AND HOW MUCH? 1 CUP COFFEE/DAY

           

           

          ADVANCE DIRECTIVE

          ADVANCE DIRECTIVE DISCUSSED WITH PATIENT:YES 19 PT. DOES NOT

          HAVE ANY ADVANCED DIRECTIVES AND SHE DECLINES INFORMATION ON HCP

          AT THIS TIME. AD

           

           

          Anglican

          [id number] NONE

           

           

          ALCOHOL SCREENING

          DID YOU HAVE A DRINK CONTAINING ALCOHOL IN THE PAST YEAR?YES

          HOW OFTEN DID YOU HAVE A DRINK CONTAINING ALCOHOL IN THE PAST

          YEAR?TWO TO THREE TIMES PER WEEK (3 POINTS)

          HOW MANY DRINKS DID YOU HAVE ON A TYPICAL DAY WHEN YOU WERE

          DRINKING IN THE PAST YEAR?3 OR 4 (1 POINT)

          HOW OFTEN DID YOU HAVE SIX OR MORE DRINKS ON ONE OCCASION IN THE

          PAST YEAR?NEVER (0 POINTS)

          POINTS4

          INTERPRETATIONPOSITIVE

           

           

          OCCUPATION: LPN.

           

           

          REVIEWED WITH PATIENT.

           

HOSPITALIZATION/MAJOR DIAGNOSTIC PROCEDURE

           

          SURGERY

           

REVIEW OF SYSTEMS

           

      REVIEWED BY:

           

          PROVIDER:    JANINE SIDDIQUI .

           

      CONSTITUTIONAL:

           

          ANY CHANGE IN YOUR MEDICAL CONDITION?    NO . CHILLS    NO .

          FEVER    NO .

           

      INFECTION:

           

          DO YOU HAVE NEW INFECTIONS?    NO . DO YOU HAVE HISTORY OF MRSA? 

            NO .

           

      MUSCULOSKELETAL:

           

          ANY NEW PATTERNS OF PAIN OR NUMBNESS?    YES- STATES SHE WAS IN

          ALOT OF PAIN RIGHT AFTER INJECTIONS AND THE PAIN GOT BETTER BUT

          NEVER TOTALLY WENT AWAY, STATES SHE HAS NOT REALLY FELT A

          DIFFERENCE IN NECK PAIN AFTER INJECTIONS .

           

      GASTROENTEROLOGY:

           

          ANY NEW CHANGE IN BOWEL CONTROL?    NO .

           

      GENITOURINARY:

           

          ANY NEW CHANGE IN BLADDER CONTROL?    NO . IS THERE A CHANCE YOU

          COULD BE PREGNANT?    NO .

           

      HEMATOLOGY/LYMPH:

           

          DO YOU TAKE ANY BLOOD THINNERS? (FOR EXAMPLE- COUMADIN, PLAVIX,

          AGGRENOX, PLATEL, PRADAXA, OR XARELTO)    NO . WHEN WAS YOUR LAST

          DOSE?    DATE: TIME:  .

           

      NEUROLOGY:

           

          HAVE YOU FALLEN IN THE PAST 12 MONTHS?    NO . ANY NEW EXTREMITY

          NUMBNESS OR WEAKNESS?    NO .

           

      CARDIOLOGY:

           

          DO YOU HAVE A PACEMAKER OR DEFIBRILLATOR?    NO .

           

      RESPIRATORY:

           

          HAVE YOU BEEN SICK IN THE PAST WEEK?    NO . FEVER    NO . FLU

          LIKE SYMPTOMS?    NO . COUGH    NO .

           

      INTEGUMENTARY:

           

          DO YOU HAVE ANY RASHES OR OPEN SORES?    NO .

           

      ALLERGIC/IMMUNO:

           

          ARE YOU ALLERGIC TO IV DYE?    NO . ANY NEW ALLERGIES?    NO .

           

      PSYCHIATRIC:

           

          DO YOU HAVE THOUGHTS OF HURTING YOURSELF OR SOMEONE ELSE?    NO .

          ARE YOU ABUSED, NEGLECTED, OR IN AN UNSAFE ENVIRONMENT?    NO .

           

      ENDOCRINOLOGY:

           

          ARE YOU DIABETIC?    NO .

           

      OTHER:

           

          DO YOU NEED ANY PRESCRIPTIONS?    YES- HYDROCODONE . IF YES,

          PLEASE LIST:    ____HYDROCODONE . ANY NEW PROBLEMS WITH YOUR

          MEDICATIONS?    NO . WHEN DID YOU LAST EAT?    ____ . WHEN DID

          YOU LAST DRINK?    ____ . WHAT DID YOU LAST DRINK?    ____ . NAME

          OF PERSON DRIVING YOU HOME?    ____ . DO YOU HAVE ANY OTHER

          QUESTIONS OR CONCERNS    YES- WOULD LIKE TO DISCUSS POSSIBILTY OF

          NICOTINE PATCHES AND STATES SHE HAS NOT REALLY FELT A DIFFERENCE

          IN PAIN AFTER INJECTIONS .

           

VITAL SIGNS

           

           LBS, HT 66", BMI 24.53 INDEX, /79 MM HG, HR 86 /MIN,

          RR 16 /MIN, TEMP 97.1 F, OXYGEN SAT % 98%, SAFE IN ENV? (Y/N)

          YES, NA INITIALS SC 09:06, REVIEWED BY: THERESE.

           

EXAMINATION

           

      GENERAL EXAMINATION:

          GENERALNO ACUTE DISTRESS, WELL NOURISHED AND HYDRATED.

           

          PSYCHAPPROPRIATE MOOD AND AFFECT .

           

          NECK: POINT TENDER OVER C5,C6 , SURROUNDING SKIN SHOWS NO

          ERYTHEMA, ECCHYMOSIS, INCREASED WARMTH, AND/OR SKIN ERUPTIONS..

           

          LUNGS:CLEAR TO AUSCULTATION BILATERALLY, NO WHEEZES, RHONCHI,

          RALES.

           

          HEART:NO MURMURS, REGULAR RATE AND RHYTHM.

           

          MUSCULOSKELETAL:EQUAL STRENGTH OF THE UPPER EXTREMITIES.

           

ASSESSMENTS

           

          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

           

TREATMENT

           

      CERVICAL SPONDYLOSIS WITHOUT MYELOPATHY

          REFILL HYDROCODONE-ACETAMINOPHEN TABLET, 7.5-325 MG, 1 TABLET AS

          NEEDED, ORALLY, Q 8-12 HRS PRN PAIN MDD=3, 30 DAY(S), 75, REFILLS

          0

          NOTES: THERAPEUTIC FACET BLOCK C5-C6,C6-C7.

          CLINICAL NOTES: 43 YEAR OLD FEMALE IN FOR POST ADA FOLLOW UP.

          GIVEN PRESENTING SYMPTOMS AND RESULTS OF PHYSICAL EXAMINATION

          RECOMMENDED THERAPEUTIC FACET BLOCK WITH POST PROCEDURAL FOLLOW

          UP. , PATIENT HAS EXPRESSED UNDERSTANDING OF AND WAS IN AGREEMENT

          WITH TREATMENT PLAN. GIVEN TIME TO ASK QUESTIONS AND EXPRESS

          CONCERNS. , ISTOP REGISTRY REVIEWED AND DEMONSTRATES COMPLLIANCE.

          (REF #[id number] ) BRINGS IN MEDICATIONS WHICH IS APPROPRIATE FOR

          WHAT WAS DISPENSED. RECENT URINE TOXICOLOGY REVIEWED. NO

          UNAUTHORIZED MEDICATIONS. NO ILLICIT SUBSTANCES AND PRESCRIBED

          MEDICATIONS WERE PRESENT. , RISKS AND BENEFITS OF NARCOTIC/OPIOD

          MEDICATIONS WERE REVIEWED WITH PATIENT - THIS INCLUDES BUT IS NOT

          LIMITED TO RISK OF DEPENDANCE/DEVELOPMENT OF ADDICTION, MOOD

          DISTURBANCE AND DEPRESSION, OSTEOPOROSIS, HORMONAL AND LABIDAL

          CHANGES, RESPIRATORY DEPRESSION AND DEATH. PATIENT IS ADVISED NOT

          TO DRIVE OR DRINK ALCOHOL WHILE ON THESE MEDICATIONS.

           

PREVENTIVE MEDICINE

           

      PAIN CLINIC TEACHING:

           

          PROCEDURE TEACHING   CERVICAL FACET JOINT INJECTION INFORMATION

          PRINTED AND REVIEWED WITH PATIENT [phone] [facility].

           

PROCEDURE CODES

           

           ESTABILISHED PATIENT [facility] CHARGE

           

DISPOSITION & COMMUNICATION

           

FOLLOW UP

           

          POST PROCEDURE (REASON: THERAPEUTIC FACET BLOCK C5-C6,C6-C7)

           

 

ELECTRONICALLY SIGNED BY TIM DING ON

          2019 AT 08:47 AM EDT

           

           

           

 

DISCLAIMER :

THIS IS A VISIT SUMMARY EXTRACTED FROM THE [facility] CHART.

IT IS NOT A COPY OF THE [facility] PROGRESS NOTE.

MTDD

## 2019-10-01 ENCOUNTER — HOSPITAL ENCOUNTER (OUTPATIENT)
Dept: HOSPITAL 53 - M PAIN | Age: 44
End: 2019-10-01
Attending: ANESTHESIOLOGY
Payer: COMMERCIAL

## 2019-10-01 DIAGNOSIS — Z53.29: ICD-10-CM

## 2019-10-01 DIAGNOSIS — M47.812: Primary | ICD-10-CM

## 2019-11-13 ENCOUNTER — HOSPITAL ENCOUNTER (OUTPATIENT)
Dept: HOSPITAL 53 - M PAIN | Age: 44
End: 2019-11-13
Attending: ANESTHESIOLOGY
Payer: COMMERCIAL

## 2019-11-13 DIAGNOSIS — I10: ICD-10-CM

## 2019-11-13 DIAGNOSIS — M47.812: Primary | ICD-10-CM

## 2019-11-13 DIAGNOSIS — F17.210: ICD-10-CM

## 2019-11-13 DIAGNOSIS — Z79.899: ICD-10-CM

## 2019-11-13 PROCEDURE — 64490 INJ PARAVERT F JNT C/T 1 LEV: CPT

## 2019-11-13 PROCEDURE — 64492 INJ PARAVERT F JNT C/T 3 LEV: CPT

## 2019-11-13 PROCEDURE — 64491 INJ PARAVERT F JNT C/T 2 LEV: CPT

## 2019-11-13 NOTE — REP
Cervical spine single view.

 

History:  Facet block for pain.

 

34 seconds of fluoroscopy time is reported.

 

Findings:  A single last image hold fluoroscopically obtained spot radiograph of

the cervical spine documents various needle positions and contrast injections

associated with cervical facet injection procedure.

 

 

Electronically Signed by

Fareed Gaffney MD 11/13/2019 05:22 P

## 2019-11-22 NOTE — ECWPNPC
PATIENT NAME: NEELA NARVAEZ

: 1975

GENDER: FEMALE

MRN: [id number]

VISIT DATE: 2019

DISCHARGE DATE: 19 1128

VISIT LOCKED DATE TIME: 

PHYSICIAN: ARTHUR SINGER MD

RESOURCE: ARTHUR SINGER MD

 

           

           

REASON FOR APPOINTMENT

           

          1. THERAPEUTIC FACET BLOCK RIGHT C2-C3, C3-C4, C4-C5

           

HISTORY OF PRESENT ILLNESS

           

      HISTORY OF PRESENT ILLNESS:

      PAIN

           

           

          THE PATIENT DESCRIBES THE PAIN...

           

      FALL RISK SCREENING:

      SCREENING

           

           

          :NO FALLS REPORTED IN THE LAST YEAR

           

CURRENT MEDICATIONS

           

          TAKING LISINOPRIL 30 MG TABLET 1 TABLET ORALLY ONCE A DAY, NOTES:

          0700

          TAKING METOPROLOL TARTRATE 25 MG TABLET 1 TABLET WITH FOOD ORALLY

          ONCE A DAY, NOTES: 0700

          TAKING FIORINAL -40 MG CAPSULE 1 CAPSULE AS NEEDED ORALLY

          TWICE A DAY AS NEEDED, NOTES: 2 DAYS AGO

          TAKING HYDROCODONE-ACETAMINOPHEN 7.5-325 MG TABLET 1 TABLET AS

          NEEDED ORALLY Q 8-12 HRS PRN PAIN MDD=3, NOTES: 19@2200

          TAKING TYLENOL 8 HOUR ARTHRITIS PAIN 650 MG TABLET EXTENDED

          RELEASE 2 TABLETS AS NEEDED ORALLY EVERY 8 HRS, NOTES: 0700

          DISCONTINUED TIZANIDINE HCL 4 MG TABLET 1 TABLET AS NEEDED ORALLY

          THREE TIMES A DAY

          DISCONTINUED TOPAMAX 50 MG TABLET 1 TABLET ORALLY TWICE A DAY

          MEDICATION LIST REVIEWED AND RECONCILED WITH THE PATIENT

           

PAST MEDICAL HISTORY

           

          HTN

          BRAIN BLEED / STROKE (10/2017)

          HEAD AND NECK PAIN

          APPENDICITIS

           

ALLERGIES

           

          N.K.D.A.

           

SURGICAL HISTORY

           

          ICP DRAIN 10/2017

           

FAMILY HISTORY

           

          FATHER: ALIVE, DIAGNOSED WITH DIABETES, HYPERTENSION

          MOTHER: ALIVE, HYPERTENSION

          2 BROTHER(S) , 4 SISTER(S) - HEALTHY. 2 SON(S) , 2 DAUGHTER(S) -

          HEALTHY.

           

SOCIAL HISTORY

           

          GENERAL:

           

          TOBACCO USE

          ARE YOU A:CURRENT SMOKER

          HOW OFTEN DO YOU SMOKE CIGARETTES?EVERY DAY

          HOW SOON AFTER YOU WAKE UP DO YOU SMOKE YOUR FIRST CIGARETTE?6-30

          MIN

          HOW MANY CIGARETTES A DAY DO YOU SMOKE?6-10

          ARE YOU INTERESTED IN QUITTING?NOT READY TO QUIT

          PATIENT COUNSELED ON THE DANGERS OF TOBACCO USE AND URGED TO

          QUIT:2019

          COUNSELED THE PATIENT ON SMOKING EFFECTS, EDUCATION

          [id number]/22/2019

          VAPORNO

          E-CIGARETTENO

           

           

          EDUCATION

          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

           

           

          LANGUAGE

          LANGUAGES SPOKEN:ENGLISH

           

           

          DOMESTIC VIOLENCE

          DO YOU FEEL SAFE IN YOUR ENVIRONMENT?YES

           

           

          RECREATIONAL DRUG USE

          DRUG USE?NO

           

           

          LEARNING BARRIERS / SPECIAL NEEDS

          BARRIERS TO LEARNING?NO

          HEARING IMPAIRED?NO

          VISION IMPAIRED?YES

          COGNITIVELY IMPAIRED?NO

          :CORRECTIVE LENSES

          READINESS TO LEARN?YES

          LEARNING PREFERENCES?NO

          LEARNING CAPABILITIES PRESENT?YES

          EMOTIONAL BARRIERS?NO

          SPECIAL DEVICES?NO

           NEEDED?NO

           

           

          PAIN CLINIC PFS, CLERGY, PUBLIC HEALTH REFERRALS

          PFS REFERRAL NEEDED?NO

          CLERGY REFERRAL NEEDED?NO

          PUBLIC HEALTH REFERRAL NEEDED?NO

          WAS THE PROVIDER NOTIFIED OF ANY PERTINENT INFO? N/A

          HAS THE PATIENT BEEN EDUCATED REGARDING HIS/HER PLAN OF CARE?YES

          HAS THE PATIENT BEEN EDUCATED REGARDING PAIN, THE RISK FOR PAIN,

          THE IMPORTANCE OF EFFECTIVE PAIN MANAGEMENT, AND THE PAIN

          ASSESSMENT PROCESS?YES

           

           

          LATEX QUESTIONNAIRE

          LATEX ALLERGY : HAVE YOU EVER DEVELOPED ANY TYPE OF REACTION

          AFTER HANDLING LATEX PRODUCTS SUCH AS RUBBER GLOVES, CONDOMS,

          DIAPHRAGMS, BALLOONS, SOCKS, OR UNDERWEAR?NO

          LATEX ALLERGY : HAVE YOU EVER DEVELOPED ANY TYPE OF REACTION

          DURING OR AFTER DENTAL APPOINTMENT, VAGINAL/RECTAL EXAMINATION,

          SURGICAL PROCEDURE, OR ANY OTHER EXPOSURE?NO

          LATEX RISK : HAVE YOU EVER HAD ANY DIFFICULTY BREATHING OR HIVES

          AFTER EATING OR HANDLING ANY FRUITS, OR VEGETABLES; SUCH AS KIWI,

          BANANAS, STONE FRUITS, OR CHESTNUTSNO

          LATEX RISK : DO YOU HAVE A PREVIOUS PERSONAL HISTORY OF MORE THAN

          NINE SURGERIES, SPINA BIFIDA, OR REPEATED CATHERIZATIONS? NO

          LATEX RISK : ARE YOU FREQUENTLY EXPOSED TO LATEX PRODUCTS IN YOUR

          OCCUPATION?NO

          DATE ASKED : 2019

           

           

          CAFFEINE

          CAFFEINE USE?YES

          HOW OFTEN AND HOW MUCH? 1 CUP COFFEE/DAY

           

           

          ADVANCE DIRECTIVE

          ADVANCE DIRECTIVE DISCUSSED WITH PATIENT:YES 19 PT. DOES NOT

          HAVE ANY ADVANCED DIRECTIVES AND SHE DECLINES INFORMATION ON HCP

          AT THIS TIME. AD 19 PT. DECLINES HCP INFORMATION AT THIS

          TIME ROSANNE CHOW

           

           

          Amish

          [id number] NONE

           

           

          ALCOHOL SCREENING

          DID YOU HAVE A DRINK CONTAINING ALCOHOL IN THE PAST YEAR?YES

          HOW OFTEN DID YOU HAVE SIX OR MORE DRINKS ON ONE OCCASION IN THE

          PAST YEAR?NEVER (0 POINTS)

          HOW MANY DRINKS DID YOU HAVE ON A TYPICAL DAY WHEN YOU WERE

          DRINKING IN THE PAST YEAR?3 OR 4 (1 POINT)

          HOW OFTEN DID YOU HAVE A DRINK CONTAINING ALCOHOL IN THE PAST

          YEAR?TWO TO THREE TIMES PER WEEK (3 POINTS)

          POINTS4

          INTERPRETATIONPOSITIVE

           

           

          OCCUPATION: LPN.

           

           

          REVIEWED WITH PATIENT.

           

HOSPITALIZATION/MAJOR DIAGNOSTIC PROCEDURE

           

          SURGERY

           

REVIEW OF SYSTEMS

           

      REVIEWED BY:

           

          PROVIDER:    _____ .

           

      CONSTITUTIONAL:

           

          ANY CHANGE IN YOUR MEDICAL CONDITION?    NO . CHILLS    NO .

          FEVER    NO .

           

      INFECTION:

           

          DO YOU HAVE NEW INFECTIONS?    NO . DO YOU HAVE HISTORY OF MRSA? 

            NO .

           

      MUSCULOSKELETAL:

           

          ANY NEW PATTERNS OF PAIN OR NUMBNESS?    NO .

           

      GASTROENTEROLOGY:

           

          ANY NEW CHANGE IN BOWEL CONTROL?    NO .

           

      GENITOURINARY:

           

          ANY NEW CHANGE IN BLADDER CONTROL?    NO . IS THERE A CHANCE YOU

          COULD BE PREGNANT?    NO .

           

      HEMATOLOGY/LYMPH:

           

          DO YOU TAKE ANY BLOOD THINNERS? (FOR EXAMPLE- COUMADIN, PLAVIX,

          AGGRENOX, PLATEL, PRADAXA, OR XARELTO)    NO . WHEN WAS YOUR LAST

          DOSE?    DATE: TIME:  .

           

      NEUROLOGY:

           

          HAVE YOU FALLEN IN THE PAST 12 MONTHS?    NO . ANY NEW EXTREMITY

          NUMBNESS OR WEAKNESS?    NO .

           

      CARDIOLOGY:

           

          DO YOU HAVE A PACEMAKER OR DEFIBRILLATOR?    NO .

           

      RESPIRATORY:

           

          HAVE YOU BEEN SICK IN THE PAST WEEK?    NO . FEVER    NO . FLU

          LIKE SYMPTOMS?    NO . COUGH    NO .

           

      INTEGUMENTARY:

           

          DO YOU HAVE ANY RASHES OR OPEN SORES?    NO .

           

      ALLERGIC/IMMUNO:

           

          ARE YOU ALLERGIC TO IV DYE?    NO . ANY NEW ALLERGIES?    NO .

           

      PSYCHIATRIC:

           

          DO YOU HAVE THOUGHTS OF HURTING YOURSELF OR SOMEONE ELSE?    NO .

          ARE YOU ABUSED, NEGLECTED, OR IN AN UNSAFE ENVIRONMENT?    NO .

           

      ENDOCRINOLOGY:

           

          ARE YOU DIABETIC?    NO .

           

      OTHER:

           

          DO YOU NEED ANY PRESCRIPTIONS?    NO . IF YES, PLEASE LIST:   

          ____ . ANY NEW PROBLEMS WITH YOUR MEDICATIONS?    NO . WHEN DID

          YOU LAST EAT?    ____19 . WHEN DID YOU LAST DRINK?   

          ____0700 . WHAT DID YOU LAST DRINK?    ____WATER . NAME OF PERSON

          DRIVING YOU HOME?    ____TASHA NARVAEZ . DO YOU HAVE ANY OTHER

          QUESTIONS OR CONCERNS    NO .

           

VITAL SIGNS

           

          .2 LBS, HT 66", BMI 24.56 INDEX, /74 MM HG, HR 71

          /MIN, RR 16 /MIN, TEMP 98.5 F, OXYGEN SAT % 100%, SAFE IN ENV?

          (Y/N) YES, NA INITIALS SC 09:54, REVIEWED BY: CURT.

           

ASSESSMENTS

           

          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

           

TREATMENT

           

      CERVICAL SPONDYLOSIS WITHOUT MYELOPATHY

          SMC FACET BLOCK (PAIN)[id number]

           

PROCEDURES

           

      PN CERVICAL FACET BLOCK LOW BILATERAL CERVICAL

          PRE PROCEDURE DIAGNOSIS    CERVICAL SPONDYLOSIS

          POST PROCEDURE DIAGNOSIS    CERVICAL SPONDYLOSIS

          PROCEDURE    RIGHT C2-C3, RIGHT C3-C4, AND RIGHT C4-C5 CERVICAL

          FACET BLOCK

          SURGEON    DR. ARTHUR SINGER

          ASSISTANT    NONE

          ANESTHESIA    LOCAL

          PRE PROCEDURE NOTE    THE PATIENT HAS HISTORY OF CHRONIC CERVICAL

          PAIN. I EVALUATED THE PATIENT AND REVIEWED THE CHART. I WENT OVER

          THE RISKS, ALTERNATIVES, AND BENEFITS ASSOCIATED WITH THIS

          PROCEDURE. THE PATIENT WOULD LIKE TO PROCEED AND GIVES CONSENT TO

          PERFORM THE PROCEDURE. THE PATIENT DENIES UNEXPLAINABLE WEIGHT

          LOSS, FEVER, CHILLS, OR NEW CHANGES IN URINARY OR BOWEL CONTROL.

          DESCRIPTION OF PROCEDURE    THE PATIENT WAS BROUGHT TO THE

          PROCEDURE ROOM AND PLACED IN THE PRONE POSITION. THE

          CERVICOTHORACIC AREA WAS CLEANED WITH CHLORAPREP SOLUTION AND

          DRAPED ASEPTICALLY. THE PROCEDURE WAS DONE UNDER STERILE

          CONDITIONS. I CHECKED LATERALITY AND THE LEVEL WHERE THE

          PROCEDURE WAS GOING TO BE PERFORMED WITH THE PATIENT AND THE

          SUPPORTING STAFF AT THE MOMENT OF THE TIME OUT IN THE PROCEDURE

          ROOM. UNDER FLUOROSCOPIC GUIDANCE, TARGET POINT WAS SELECTED AT

          THE RIGHT C2-C3, RIGHT C3-C4, AND RIGHT C4-C5 CERVICAL FACET

          JOINTS. TARGET POINTS WERE SELECTED AFTER LATERAL ROTATION AND

          TILT OF THE MAGNIFIER OF THE C-ARM. LIDOCAINE 0.5% WAS USED TO

          NUMB THE SKIN AND THE SUBCUTANEOUS TISSUE BELOW IT. SPINAL

          NEEDLES, 22-GAUGE, WERE ADVANCED UNDER FLUOROSCOPIC GUIDANCE AND

          FOLLOWING PATIENT FEEDBACK UNTIL THE TARGETS WERE TOUCHED. THE

          POSITION OF THE NEEDLES WAS VERIFIED WITH AP AND LATERAL VIEWS.

          AFTER PROPER POSITION OF THE NEEDLES WAS ACHIEVED, ISOVUE M DYE

          30, 0.1 ML WAS INJECTED SHOWING SPREAD OF THE DYE. THEN A

          SOLUTION OF 0.9 ML OF BUPIVACAINE 0.125% AND KENALOG 10 MG WAS

          INJECTED AT EACH SITE. THERE WAS NO EVIDENCE OF BLOOD,

          PARESTHESIA OR CEREBROSPINAL FLUID DURING THE PROCEDURE. THE

          PATIENT WAS SENT TO THE RECOVERY ROOM. THE PATIENT WAS MOVING THE

          EXTREMITIES AND DOING WELL. THERE WAS NO COMPLICATION DURING THE

          PROCEDURE. FLUOROSCOPY TIME WAS 34 SECONDS

          POST PROCEDURE NOTE    THE PATIENT WILL BE SEEN IN A FOLLOW UP IN

          THE NEXT FEW WEEKS. INSTRUCTIONS WERE GIVEN, QUESTIONS WERE

          ANSWERED, AND THE PATIENT EXPRESSED UNDERSTANDING AND AGREES WITH

          THE PLAN. I, TONY MUNIZ, DOCUMENTED THE ABOVE INFORMATION

          ACTING AS A SCRIBE FOR DR. SINGER. I HAVE REVIEWED THE ABOVE

          DOCUMENT, WRITTEN BY TONY MUNIZ SCRIBGRACE AND I VERIFY THAT IT

          IS ACCURATE.

           

PROCEDURE CODES

           

          65102 INJ PARAVERT F JNT C/T 1 LEV, MODIFIERS: RT

           

          61265 INJ PARAVERT F JNT C/T 2 LEV, MODIFIERS: RT

           

          08795 INJ PARAVERT F JNT C/T 3 LEV, MODIFIERS: RT

           

          6045F RADXPS IN END [id number] PXD

           

DISPOSITION & COMMUNICATION

           

FOLLOW UP

           

          3 WEEKS

           

 

ELECTRONICALLY SIGNED BY ARTHUR SINGER MD, MD ON

          2019 AT 11:37 AM EST

           

           

           

 

DISCLAIMER :

THIS IS A VISIT SUMMARY EXTRACTED FROM THE [employer] CHART.

IT IS NOT A COPY OF THE [employer] PROGRESS NOTE.

MTDD

## 2020-01-22 ENCOUNTER — HOSPITAL ENCOUNTER (OUTPATIENT)
Dept: HOSPITAL 53 - M PAIN | Age: 45
End: 2020-01-22
Attending: NURSE PRACTITIONER
Payer: COMMERCIAL

## 2020-01-22 DIAGNOSIS — Z79.899: ICD-10-CM

## 2020-01-22 DIAGNOSIS — Z79.891: ICD-10-CM

## 2020-01-22 DIAGNOSIS — M50.10: Primary | ICD-10-CM

## 2020-01-22 DIAGNOSIS — F17.210: ICD-10-CM

## 2020-01-24 NOTE — ECWPNPC
PATIENT NAME: NEELA NARVAEZ

: 1975

GENDER: FEMALE

MRN: [id number]

VISIT DATE: 2020

DISCHARGE DATE: 20 1359

VISIT LOCKED DATE TIME: 

PHYSICIAN: NADER PENG 

RESOURCE: NADER PENG 

 

           

           

REASON FOR APPOINTMENT

           

          1. POST PROC

           

HISTORY OF PRESENT ILLNESS

           

      HISTORY OF PRESENT ILLNESS:

      PAIN

           

           

          THE PATIENT DESCRIBES THE PAIN...

           

           44-YEAR-OLD FEMALE IN FOR CHRONIC PAIN FOLLOW-UP. SHE RATES HER

          PAIN CURRENTLY AT A 4 OUT OF 10 AND DESCRIBES IT AS ACHING,

          SHARP, AND STABBING. SHE FEELS HER MEDS ARE WORKING AND DENIES

          MED SIDE EFFECTS AT THIS TIME.

           

      FALL RISK SCREENING:

      SCREENING

           

           

          :NO FALLS REPORTED IN THE LAST YEAR

           

CURRENT MEDICATIONS

           

          TAKING LISINOPRIL 30 MG TABLET 1 TABLET ORALLY ONCE A DAY

          TAKING METOPROLOL TARTRATE 25 MG TABLET 1 TABLET WITH FOOD ORALLY

          ONCE A DAY

          TAKING TYLENOL 8 HOUR ARTHRITIS PAIN 650 MG TABLET EXTENDED

          RELEASE 2 TABLETS AS NEEDED ORALLY EVERY 8 HRS

          TAKING FIORINAL -40 MG CAPSULE 1 CAPSULE AS NEEDED ORALLY

          TWICE A DAY AS NEEDED

          TAKING NORCO 7.5-325 MG TABLET 1 TABLET AS NEEDED ORALLY EVERY

          8-12 HRS MDD3

          NOT-TAKING HYDROCODONE-ACETAMINOPHEN 7.5-325 MG TABLET 1 TABLET

          AS NEEDED ORALLY Q 8-12 HRS PRN PAIN MDD=3, NOTES: DUPLICATE

          MEDICATION LIST REVIEWED AND RECONCILED WITH THE PATIENT

           

PAST MEDICAL HISTORY

           

          HTN

          BRAIN BLEED / STROKE (10/2017)

          HEAD AND NECK PAIN

          APPENDICITIS

           

ALLERGIES

           

          N.K.D.A.

           

SURGICAL HISTORY

           

          ICP DRAIN 10/2017

           

FAMILY HISTORY

           

          FATHER: ALIVE, DIAGNOSED WITH DIABETES, HYPERTENSION

          MOTHER: ALIVE, HYPERTENSION

          2 BROTHER(S) , 4 SISTER(S) - HEALTHY. 2 SON(S) , 2 DAUGHTER(S) -

          HEALTHY.

           

SOCIAL HISTORY

           

          GENERAL:

           

          TOBACCO USE

          ARE YOU A:CURRENT SMOKER

          ARE YOU INTERESTED IN QUITTING?NOT READY TO QUIT

          COUNSELED THE PATIENT ON SMOKING EFFECTS, EDUCATION

          [id number]/22/2020

          HOW MANY CIGARETTES A DAY DO YOU SMOKE?6-10

          HOW SOON AFTER YOU WAKE UP DO YOU SMOKE YOUR FIRST CIGARETTE?6-30

          MIN

          HOW OFTEN DO YOU SMOKE CIGARETTES?EVERY DAY

          PATIENT COUNSELED ON THE DANGERS OF TOBACCO USE AND URGED TO

          QUIT:2020

          VAPORNO

          E-CIGARETTENO

           

           

          EDUCATION

          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

           

           

          LANGUAGE

          LANGUAGES SPOKEN:ENGLISH

           

           

          DOMESTIC VIOLENCE

          DO YOU FEEL SAFE IN YOUR ENVIRONMENT?YES

           

           

          RECREATIONAL DRUG USE

          DRUG USE?NO

           

           

          LEARNING BARRIERS / SPECIAL NEEDS

          BARRIERS TO LEARNING?NO

          HEARING IMPAIRED?NO

          VISION IMPAIRED?YES

          COGNITIVELY IMPAIRED?NO

          :CORRECTIVE LENSES

          READINESS TO LEARN?YES

          LEARNING PREFERENCES?NO

          LEARNING CAPABILITIES PRESENT?YES

          EMOTIONAL BARRIERS?NO

          SPECIAL DEVICES?NO

           NEEDED?NO

           

           

          PAIN CLINIC PFS, CLERGY, PUBLIC HEALTH REFERRALS

          PFS REFERRAL NEEDED?NO

          CLERGY REFERRAL NEEDED?NO

          PUBLIC HEALTH REFERRAL NEEDED?NO

          WAS THE PROVIDER NOTIFIED OF ANY PERTINENT INFO? N/A

          HAS THE PATIENT BEEN EDUCATED REGARDING HIS/HER PLAN OF CARE?YES

          HAS THE PATIENT BEEN EDUCATED REGARDING PAIN, THE RISK FOR PAIN,

          THE IMPORTANCE OF EFFECTIVE PAIN MANAGEMENT, AND THE PAIN

          ASSESSMENT PROCESS?YES

           

           

          LATEX QUESTIONNAIRE

          LATEX ALLERGY : HAVE YOU EVER DEVELOPED ANY TYPE OF REACTION

          AFTER HANDLING LATEX PRODUCTS SUCH AS RUBBER GLOVES, CONDOMS,

          DIAPHRAGMS, BALLOONS, SOCKS, OR UNDERWEAR?NO

          LATEX ALLERGY : HAVE YOU EVER DEVELOPED ANY TYPE OF REACTION

          DURING OR AFTER DENTAL APPOINTMENT, VAGINAL/RECTAL EXAMINATION,

          SURGICAL PROCEDURE, OR ANY OTHER EXPOSURE?NO

          LATEX RISK : HAVE YOU EVER HAD ANY DIFFICULTY BREATHING OR HIVES

          AFTER EATING OR HANDLING ANY FRUITS, OR VEGETABLES; SUCH AS KIWI,

          BANANAS, STONE FRUITS, OR CHESTNUTSNO

          LATEX RISK : DO YOU HAVE A PREVIOUS PERSONAL HISTORY OF MORE THAN

          NINE SURGERIES, SPINA BIFIDA, OR REPEATED CATHERIZATIONS? NO

          LATEX RISK : ARE YOU FREQUENTLY EXPOSED TO LATEX PRODUCTS IN YOUR

          OCCUPATION?NO

          DATE ASKED : 2019

           

           

          CAFFEINE

          CAFFEINE USE?YES

          HOW OFTEN AND HOW MUCH? 1 CUP COFFEE/DAY

           

           

          ADVANCE DIRECTIVE

          ADVANCE DIRECTIVE DISCUSSED WITH PATIENT:YES 2020 PATIENT

          DOES NOT HAVE ANY ADVANCED DIRECTIVES AND SHE DECLINES

          INFORMATION ON HCP AT THIS TIME. DOMINIK

           

           

          Holiness

          [id number] NONE

           

           

          ALCOHOL SCREENING

          DID YOU HAVE A DRINK CONTAINING ALCOHOL IN THE PAST YEAR?YES

          HOW OFTEN DID YOU HAVE SIX OR MORE DRINKS ON ONE OCCASION IN THE

          PAST YEAR?NEVER (0 POINTS)

          HOW MANY DRINKS DID YOU HAVE ON A TYPICAL DAY WHEN YOU WERE

          DRINKING IN THE PAST YEAR?3 OR 4 (1 POINT)

          HOW OFTEN DID YOU HAVE A DRINK CONTAINING ALCOHOL IN THE PAST

          YEAR?TWO TO THREE TIMES PER WEEK (3 POINTS)

          POINTS4

          INTERPRETATIONPOSITIVE

           

           

          OCCUPATION: LPN.

           

           

          REVIEWED WITH PATIENT 2020 1333 JS.

           

HOSPITALIZATION/MAJOR DIAGNOSTIC PROCEDURE

           

          SURGERY

           

REVIEW OF SYSTEMS

           

      REVIEWED BY:

           

          PROVIDER:    JANINE SIDDIQUI .

           

      CONSTITUTIONAL:

           

          ANY CHANGE IN YOUR MEDICAL CONDITION?    NO . CHILLS    NO .

          FEVER    NO .

           

      INFECTION:

           

          DO YOU HAVE NEW INFECTIONS?    NO . DO YOU HAVE HISTORY OF MRSA? 

            NO .

           

      MUSCULOSKELETAL:

           

          ANY NEW PATTERNS OF PAIN OR NUMBNESS?    NO .

           

      GASTROENTEROLOGY:

           

          ANY NEW CHANGE IN BOWEL CONTROL?    NO .

           

      GENITOURINARY:

           

          ANY NEW CHANGE IN BLADDER CONTROL?    NO . IS THERE A CHANCE YOU

          COULD BE PREGNANT?    NO .

           

      HEMATOLOGY/LYMPH:

           

          DO YOU TAKE ANY BLOOD THINNERS? (FOR EXAMPLE- COUMADIN, PLAVIX,

          AGGRENOX, PLATEL, PRADAXA, OR XARELTO)    NO . WHEN WAS YOUR LAST

          DOSE?    DATE: TIME:  .

           

      NEUROLOGY:

           

          HAVE YOU FALLEN IN THE PAST 12 MONTHS?    NO . ANY NEW EXTREMITY

          NUMBNESS OR WEAKNESS?    NO .

           

      CARDIOLOGY:

           

          DO YOU HAVE A PACEMAKER OR DEFIBRILLATOR?    NO .

           

      RESPIRATORY:

           

          HAVE YOU BEEN SICK IN THE PAST WEEK?    NO . FEVER    NO . FLU

          LIKE SYMPTOMS?    NO . COUGH    NO .

           

      INTEGUMENTARY:

           

          DO YOU HAVE ANY RASHES OR OPEN SORES?    NO .

           

      ALLERGIC/IMMUNO:

           

          ARE YOU ALLERGIC TO IV DYE?    NO . ANY NEW ALLERGIES?    NO .

           

      PSYCHIATRIC:

           

          DO YOU HAVE THOUGHTS OF HURTING YOURSELF OR SOMEONE ELSE?    NO .

          ARE YOU ABUSED, NEGLECTED, OR IN AN UNSAFE ENVIRONMENT?    NO .

           

      ENDOCRINOLOGY:

           

          ARE YOU DIABETIC?    NO .

           

      OTHER:

           

          DO YOU NEED ANY PRESCRIPTIONS?    YES . IF YES, PLEASE LIST:   

          ____HYDROCODONE . ANY NEW PROBLEMS WITH YOUR MEDICATIONS?    NO .

          WHEN DID YOU LAST EAT?    ____ . WHEN DID YOU LAST DRINK?    ____

          . WHAT DID YOU LAST DRINK?    ____ . NAME OF PERSON DRIVING YOU

          HOME?    ____ . DO YOU HAVE ANY OTHER QUESTIONS OR CONCERNS    NO

          .

           

VITAL SIGNS

           

          .4 LBS, HT 66", BMI 24.92 INDEX, /84 MM HG, HR 70

          /MIN, RR 18 /MIN, TEMP 97.1 F, OXYGEN SAT % 100% RA, BLOOD

          GLUCOSE LEVEL N/S, SAFE IN ENV? (Y/N) YES, REVIEWED BY: VERITO LUBIN CMA.

           

ASSESSMENTS

           

          CERVICAL DISC DISORDER WITH RADICULOPATHY OF CERVICAL REGION -

          M50.10 (PRIMARY)

           

TREATMENT

           

      CERVICAL DISC DISORDER WITH RADICULOPATHY OF

          CERVICAL REGION

          START GABAPENTIN CAPSULE, 100 MG, 1 CAPSULE, ORALLY, THREE TIMES

          DAILY, 7 DAYS, 21

          START GABAPENTIN CAPSULE, 300 MG, 1 CAPSULE, ORALLY, THREE TIMES

          DAILY START DAY 8, 30 DAY(S), 90

          CONTINUE NORCO TABLET, 7.5-325 MG, 1 TABLET AS NEEDED, ORALLY,

          EVERY 8-12 HRS MDD3, 30 DAYS, 75

          CLINICAL NOTES: 44-YEAR-OLD FEMALE IN FOR CHRONIC PAIN FOLLOW-UP.

          GIVEN PRESENTING SYMPTOMS AND RESULTS OF PHYSICAL EXAMINATION

          RECOMMENDED STARTING GABAPENTIN WITH FOLLOW-UP IN 1 MONTH TO

          DETERMINE EFFICACY OF TREATMENT. PATIENT HAS EXPRESSED

          UNDERSTANDING OF AND WAS IN AGREEMENT WITH TREATMENT PLAN. GIVEN

          TIME TO ASK QUESTIONS AND EXPRESS CONCERNS., ISTOP REGISTRY

          REVIEWED AND DEMONSTRATES COMPLLIANCE. (REF # [id number] ) BRINGS

          IN MEDICATIONS WHICH IS APPROPRIATE FOR WHAT WAS DISPENSED.

          RECENT URINE TOXICOLOGY REVIEWED. NO UNAUTHORIZED MEDICATIONS. NO

          ILLICIT SUBSTANCES AND PRESCRIBED MEDICATIONS WERE PRESENT.

           

PROCEDURE CODES

           

           ESTABILISHED PATIENT [facility] FACILITY CHARGE

           

DISPOSITION & COMMUNICATION

           

FOLLOW UP

           

          4 WEEKS (REASON: NECK PAIN, NEW MEDICATION)

           

 

ELECTRONICALLY SIGNED BY TIM DING ON

          2020 AT 08:35 AM EST

           

           

           

 

DISCLAIMER :

THIS IS A VISIT SUMMARY EXTRACTED FROM THE [email or web address] CHART.

IT IS NOT A COPY OF THE [employer] PROGRESS NOTE.

RUMA

## 2020-08-10 ENCOUNTER — HOSPITAL ENCOUNTER (OUTPATIENT)
Dept: HOSPITAL 53 - M OUTALCOH | Age: 45
End: 2020-08-10
Attending: PSYCHIATRY & NEUROLOGY
Payer: COMMERCIAL

## 2020-08-10 DIAGNOSIS — F11.20: Primary | ICD-10-CM

## 2020-08-31 ENCOUNTER — HOSPITAL ENCOUNTER (OUTPATIENT)
Dept: HOSPITAL 53 - M OUTALCOH | Age: 45
End: 2020-08-31
Attending: PSYCHIATRY & NEUROLOGY
Payer: COMMERCIAL

## 2020-08-31 DIAGNOSIS — Z03.89: Primary | ICD-10-CM

## 2020-09-30 ENCOUNTER — HOSPITAL ENCOUNTER (OUTPATIENT)
Dept: HOSPITAL 53 - M OUTALCOH | Age: 45
End: 2020-09-30
Attending: PSYCHIATRY & NEUROLOGY
Payer: COMMERCIAL

## 2020-09-30 DIAGNOSIS — F11.20: Primary | ICD-10-CM

## 2020-09-30 DIAGNOSIS — F17.200: ICD-10-CM

## 2020-09-30 DIAGNOSIS — F10.20: ICD-10-CM

## 2020-10-27 ENCOUNTER — HOSPITAL ENCOUNTER (OUTPATIENT)
Dept: HOSPITAL 53 - M OUTALCOH | Age: 45
LOS: 4 days | End: 2020-10-31
Attending: PSYCHIATRY & NEUROLOGY
Payer: COMMERCIAL

## 2020-10-27 DIAGNOSIS — F17.200: ICD-10-CM

## 2020-10-27 DIAGNOSIS — F10.20: ICD-10-CM

## 2020-10-27 DIAGNOSIS — F11.20: Primary | ICD-10-CM

## 2020-11-30 ENCOUNTER — HOSPITAL ENCOUNTER (OUTPATIENT)
Dept: HOSPITAL 53 - M OUTALCOH | Age: 45
End: 2020-11-30
Attending: PSYCHIATRY & NEUROLOGY
Payer: COMMERCIAL

## 2020-11-30 DIAGNOSIS — F11.20: Primary | ICD-10-CM

## 2020-11-30 DIAGNOSIS — F17.200: ICD-10-CM

## 2020-11-30 DIAGNOSIS — F10.20: ICD-10-CM

## 2020-12-28 ENCOUNTER — HOSPITAL ENCOUNTER (OUTPATIENT)
Dept: HOSPITAL 53 - M OUTALCOH | Age: 45
LOS: 3 days | End: 2020-12-31
Attending: PSYCHIATRY & NEUROLOGY
Payer: COMMERCIAL

## 2020-12-28 DIAGNOSIS — F11.20: Primary | ICD-10-CM

## 2020-12-28 DIAGNOSIS — F10.20: ICD-10-CM

## 2020-12-28 DIAGNOSIS — F17.200: ICD-10-CM

## 2021-01-25 ENCOUNTER — HOSPITAL ENCOUNTER (OUTPATIENT)
Dept: HOSPITAL 53 - M OUTALCOH | Age: 46
LOS: 6 days | End: 2021-01-31
Attending: PSYCHIATRY & NEUROLOGY
Payer: COMMERCIAL

## 2021-01-25 DIAGNOSIS — F11.20: Primary | ICD-10-CM

## 2021-01-25 DIAGNOSIS — F10.20: ICD-10-CM

## 2021-01-25 DIAGNOSIS — F17.200: ICD-10-CM

## 2021-02-24 ENCOUNTER — HOSPITAL ENCOUNTER (OUTPATIENT)
Dept: HOSPITAL 53 - M OUTALCOH | Age: 46
LOS: 4 days | End: 2021-02-28
Attending: PSYCHIATRY & NEUROLOGY
Payer: COMMERCIAL

## 2021-02-24 DIAGNOSIS — F10.20: ICD-10-CM

## 2021-02-24 DIAGNOSIS — F17.200: ICD-10-CM

## 2021-02-24 DIAGNOSIS — F11.20: Primary | ICD-10-CM

## 2021-03-22 ENCOUNTER — HOSPITAL ENCOUNTER (OUTPATIENT)
Dept: HOSPITAL 53 - M OUTALCOH | Age: 46
LOS: 9 days | End: 2021-03-31
Attending: PSYCHIATRY & NEUROLOGY
Payer: COMMERCIAL

## 2021-03-22 DIAGNOSIS — F10.20: ICD-10-CM

## 2021-03-22 DIAGNOSIS — F11.20: Primary | ICD-10-CM

## 2021-03-22 DIAGNOSIS — F17.200: ICD-10-CM

## 2021-04-05 ENCOUNTER — HOSPITAL ENCOUNTER (OUTPATIENT)
Dept: HOSPITAL 53 - M OUTALCOH | Age: 46
LOS: 25 days | End: 2021-04-30
Attending: PSYCHIATRY & NEUROLOGY
Payer: COMMERCIAL

## 2021-04-05 DIAGNOSIS — F17.200: ICD-10-CM

## 2021-04-05 DIAGNOSIS — F10.20: ICD-10-CM

## 2021-04-05 DIAGNOSIS — F11.20: Primary | ICD-10-CM

## 2021-05-28 ENCOUNTER — HOSPITAL ENCOUNTER (OUTPATIENT)
Dept: HOSPITAL 53 - M OUTALCOH | Age: 46
LOS: 3 days | End: 2021-05-31
Attending: PSYCHIATRY & NEUROLOGY
Payer: COMMERCIAL

## 2021-05-28 DIAGNOSIS — F17.200: ICD-10-CM

## 2021-05-28 DIAGNOSIS — F11.20: Primary | ICD-10-CM

## 2021-05-28 DIAGNOSIS — F10.20: ICD-10-CM

## 2021-12-09 ENCOUNTER — HOSPITAL ENCOUNTER (OUTPATIENT)
Dept: HOSPITAL 53 - M EMP | Age: 46
End: 2021-12-09
Attending: FAMILY MEDICINE

## 2021-12-09 DIAGNOSIS — Z11.52: Primary | ICD-10-CM

## 2022-02-21 ENCOUNTER — HOSPITAL ENCOUNTER (OUTPATIENT)
Dept: HOSPITAL 53 - M OUTALCOH | Age: 47
End: 2022-02-21
Attending: PSYCHIATRY & NEUROLOGY
Payer: COMMERCIAL

## 2022-02-21 DIAGNOSIS — F17.200: ICD-10-CM

## 2022-02-21 DIAGNOSIS — F11.20: Primary | ICD-10-CM

## 2022-02-28 ENCOUNTER — HOSPITAL ENCOUNTER (OUTPATIENT)
Dept: HOSPITAL 53 - M OUTALCOH | Age: 47
End: 2022-02-28
Attending: PSYCHIATRY & NEUROLOGY
Payer: COMMERCIAL

## 2022-02-28 DIAGNOSIS — F11.20: Primary | ICD-10-CM

## 2022-02-28 DIAGNOSIS — F17.200: ICD-10-CM

## 2022-03-30 ENCOUNTER — HOSPITAL ENCOUNTER (OUTPATIENT)
Dept: HOSPITAL 53 - M OUTALCOH | Age: 47
LOS: 1 days | End: 2022-03-31
Attending: PSYCHIATRY & NEUROLOGY
Payer: COMMERCIAL

## 2022-03-30 DIAGNOSIS — F11.20: Primary | ICD-10-CM

## 2022-03-30 DIAGNOSIS — F17.200: ICD-10-CM

## 2022-04-27 ENCOUNTER — HOSPITAL ENCOUNTER (OUTPATIENT)
Dept: HOSPITAL 53 - M OUTALCOH | Age: 47
LOS: 3 days | End: 2022-04-30
Attending: PSYCHIATRY & NEUROLOGY
Payer: COMMERCIAL

## 2022-04-27 DIAGNOSIS — F17.200: ICD-10-CM

## 2022-04-27 DIAGNOSIS — F11.20: Primary | ICD-10-CM

## 2022-05-25 ENCOUNTER — HOSPITAL ENCOUNTER (OUTPATIENT)
Dept: HOSPITAL 53 - M OUTALCOH | Age: 47
LOS: 6 days | End: 2022-05-31
Attending: PSYCHIATRY & NEUROLOGY
Payer: COMMERCIAL

## 2022-05-25 DIAGNOSIS — F11.20: Primary | ICD-10-CM

## 2022-05-25 DIAGNOSIS — F17.200: ICD-10-CM

## 2022-06-30 ENCOUNTER — HOSPITAL ENCOUNTER (OUTPATIENT)
Dept: HOSPITAL 53 - M OUTALCOH | Age: 47
End: 2022-06-30
Attending: PSYCHIATRY & NEUROLOGY
Payer: COMMERCIAL

## 2022-06-30 DIAGNOSIS — F11.20: Primary | ICD-10-CM

## 2022-06-30 DIAGNOSIS — F17.200: ICD-10-CM

## 2022-07-21 ENCOUNTER — HOSPITAL ENCOUNTER (OUTPATIENT)
Dept: HOSPITAL 53 - M OUTALCOH | Age: 47
LOS: 10 days | End: 2022-07-31
Attending: PSYCHIATRY & NEUROLOGY
Payer: COMMERCIAL

## 2022-07-21 DIAGNOSIS — F17.200: ICD-10-CM

## 2022-07-21 DIAGNOSIS — F11.20: Primary | ICD-10-CM

## 2022-07-27 ENCOUNTER — HOSPITAL ENCOUNTER (OUTPATIENT)
Dept: HOSPITAL 53 - M EMP | Age: 47
End: 2022-07-27
Attending: FAMILY MEDICINE

## 2022-07-27 DIAGNOSIS — Z11.52: Primary | ICD-10-CM

## 2022-07-28 ENCOUNTER — HOSPITAL ENCOUNTER (OUTPATIENT)
Dept: HOSPITAL 53 - M EMP | Age: 47
End: 2022-07-28
Attending: FAMILY MEDICINE

## 2022-07-28 DIAGNOSIS — Z11.52: Primary | ICD-10-CM

## 2022-08-30 ENCOUNTER — HOSPITAL ENCOUNTER (OUTPATIENT)
Dept: HOSPITAL 53 - M OUTALCOH | Age: 47
LOS: 1 days | End: 2022-08-31
Attending: PSYCHIATRY & NEUROLOGY
Payer: COMMERCIAL

## 2022-08-30 DIAGNOSIS — F11.20: Primary | ICD-10-CM

## 2022-08-30 DIAGNOSIS — F17.200: ICD-10-CM

## 2022-09-29 ENCOUNTER — HOSPITAL ENCOUNTER (OUTPATIENT)
Dept: HOSPITAL 53 - M OUTALCOH | Age: 47
LOS: 1 days | End: 2022-09-30
Attending: PSYCHIATRY & NEUROLOGY
Payer: COMMERCIAL

## 2022-09-29 DIAGNOSIS — F17.200: ICD-10-CM

## 2022-09-29 DIAGNOSIS — F11.20: Primary | ICD-10-CM

## 2022-10-31 ENCOUNTER — HOSPITAL ENCOUNTER (OUTPATIENT)
Dept: HOSPITAL 53 - M OUTALCOH | Age: 47
End: 2022-10-31
Attending: PSYCHIATRY & NEUROLOGY
Payer: COMMERCIAL

## 2022-10-31 DIAGNOSIS — F17.200: ICD-10-CM

## 2022-10-31 DIAGNOSIS — F11.20: Primary | ICD-10-CM

## 2022-11-28 ENCOUNTER — HOSPITAL ENCOUNTER (OUTPATIENT)
Dept: HOSPITAL 53 - M OUTALCOH | Age: 47
LOS: 2 days | End: 2022-11-30
Attending: PSYCHIATRY & NEUROLOGY
Payer: COMMERCIAL

## 2022-11-28 DIAGNOSIS — F17.200: ICD-10-CM

## 2022-11-28 DIAGNOSIS — F11.20: Primary | ICD-10-CM

## 2022-12-15 ENCOUNTER — HOSPITAL ENCOUNTER (OUTPATIENT)
Dept: HOSPITAL 53 - M RAD | Age: 47
End: 2022-12-15
Payer: COMMERCIAL

## 2022-12-15 DIAGNOSIS — M79.662: Primary | ICD-10-CM

## 2023-01-23 ENCOUNTER — HOSPITAL ENCOUNTER (OUTPATIENT)
Dept: HOSPITAL 53 - M LABSMTC | Age: 48
End: 2023-01-23
Attending: FAMILY MEDICINE

## 2023-01-23 DIAGNOSIS — Z11.52: Primary | ICD-10-CM

## 2023-01-30 ENCOUNTER — HOSPITAL ENCOUNTER (OUTPATIENT)
Dept: HOSPITAL 53 - M OUTALCOH | Age: 48
LOS: 1 days | End: 2023-01-31
Attending: PSYCHIATRY & NEUROLOGY
Payer: COMMERCIAL

## 2023-01-30 DIAGNOSIS — F11.20: Primary | ICD-10-CM

## 2023-01-30 DIAGNOSIS — F17.200: ICD-10-CM

## 2023-02-27 ENCOUNTER — HOSPITAL ENCOUNTER (OUTPATIENT)
Dept: HOSPITAL 53 - M OUTALCOH | Age: 48
LOS: 1 days | End: 2023-02-28
Attending: PSYCHIATRY & NEUROLOGY
Payer: COMMERCIAL

## 2023-02-27 DIAGNOSIS — F11.20: Primary | ICD-10-CM

## 2023-02-27 DIAGNOSIS — F17.200: ICD-10-CM

## 2023-03-30 ENCOUNTER — HOSPITAL ENCOUNTER (OUTPATIENT)
Dept: HOSPITAL 53 - M OUTALCOH | Age: 48
LOS: 1 days | End: 2023-03-31
Attending: PSYCHIATRY & NEUROLOGY
Payer: COMMERCIAL

## 2023-03-30 DIAGNOSIS — F11.20: Primary | ICD-10-CM

## 2023-03-30 DIAGNOSIS — F17.200: ICD-10-CM

## 2023-04-27 ENCOUNTER — HOSPITAL ENCOUNTER (OUTPATIENT)
Dept: HOSPITAL 53 - M OUTALCOH | Age: 48
LOS: 3 days | End: 2023-04-30
Attending: PSYCHIATRY & NEUROLOGY
Payer: COMMERCIAL

## 2023-04-27 DIAGNOSIS — F17.200: ICD-10-CM

## 2023-04-27 DIAGNOSIS — F11.20: Primary | ICD-10-CM

## 2023-05-18 ENCOUNTER — HOSPITAL ENCOUNTER (OUTPATIENT)
Dept: HOSPITAL 53 - M OUTALCOH | Age: 48
LOS: 13 days | End: 2023-05-31
Attending: PSYCHIATRY & NEUROLOGY
Payer: COMMERCIAL

## 2023-05-18 DIAGNOSIS — F11.20: Primary | ICD-10-CM

## 2023-05-18 DIAGNOSIS — F17.200: ICD-10-CM

## 2023-06-14 ENCOUNTER — HOSPITAL ENCOUNTER (OUTPATIENT)
Dept: HOSPITAL 53 - M OUTALCOH | Age: 48
LOS: 16 days | End: 2023-06-30
Attending: PSYCHIATRY & NEUROLOGY
Payer: COMMERCIAL

## 2023-06-14 DIAGNOSIS — F17.200: ICD-10-CM

## 2023-06-14 DIAGNOSIS — F11.20: Primary | ICD-10-CM

## 2023-07-20 ENCOUNTER — HOSPITAL ENCOUNTER (OUTPATIENT)
Dept: HOSPITAL 53 - M OUTALCOH | Age: 48
LOS: 11 days | End: 2023-07-31
Attending: PSYCHIATRY & NEUROLOGY
Payer: COMMERCIAL

## 2023-07-20 DIAGNOSIS — F17.200: ICD-10-CM

## 2023-07-20 DIAGNOSIS — F11.20: Primary | ICD-10-CM

## 2023-08-21 ENCOUNTER — HOSPITAL ENCOUNTER (OUTPATIENT)
Dept: HOSPITAL 53 - M OUTALCOH | Age: 48
LOS: 10 days | End: 2023-08-31
Attending: PSYCHIATRY & NEUROLOGY
Payer: COMMERCIAL

## 2023-08-21 DIAGNOSIS — F17.200: ICD-10-CM

## 2023-08-21 DIAGNOSIS — F11.20: Primary | ICD-10-CM

## 2023-10-16 ENCOUNTER — HOSPITAL ENCOUNTER (OUTPATIENT)
Dept: HOSPITAL 53 - M OUTALCOH | Age: 48
LOS: 15 days | End: 2023-10-31
Attending: PSYCHIATRY & NEUROLOGY
Payer: COMMERCIAL

## 2023-10-16 DIAGNOSIS — F17.200: ICD-10-CM

## 2023-10-16 DIAGNOSIS — F11.20: Primary | ICD-10-CM

## 2023-11-27 ENCOUNTER — HOSPITAL ENCOUNTER (OUTPATIENT)
Dept: HOSPITAL 53 - M OUTALCOH | Age: 48
LOS: 3 days | End: 2023-11-30
Attending: PSYCHIATRY & NEUROLOGY
Payer: COMMERCIAL

## 2023-11-27 DIAGNOSIS — F17.200: ICD-10-CM

## 2023-11-27 DIAGNOSIS — F11.20: Primary | ICD-10-CM

## 2024-01-22 ENCOUNTER — HOSPITAL ENCOUNTER (OUTPATIENT)
Dept: HOSPITAL 53 - M OUTALCOH | Age: 49
LOS: 9 days | End: 2024-01-31
Attending: PSYCHIATRY & NEUROLOGY
Payer: COMMERCIAL

## 2024-01-22 DIAGNOSIS — F17.200: ICD-10-CM

## 2024-01-22 DIAGNOSIS — F11.20: Primary | ICD-10-CM

## 2024-02-21 ENCOUNTER — HOSPITAL ENCOUNTER (OUTPATIENT)
Dept: HOSPITAL 53 - M OUTALCOH | Age: 49
LOS: 8 days | End: 2024-02-29
Attending: PSYCHIATRY & NEUROLOGY
Payer: COMMERCIAL

## 2024-02-21 DIAGNOSIS — F17.200: ICD-10-CM

## 2024-02-21 DIAGNOSIS — F11.20: Primary | ICD-10-CM

## 2024-03-18 ENCOUNTER — HOSPITAL ENCOUNTER (OUTPATIENT)
Dept: HOSPITAL 53 - M OUTALCOH | Age: 49
LOS: 13 days | End: 2024-03-31
Attending: PSYCHIATRY & NEUROLOGY
Payer: COMMERCIAL

## 2024-03-18 DIAGNOSIS — F17.200: ICD-10-CM

## 2024-03-18 DIAGNOSIS — F11.20: Primary | ICD-10-CM

## 2024-04-15 ENCOUNTER — HOSPITAL ENCOUNTER (OUTPATIENT)
Dept: HOSPITAL 53 - M OUTALCOH | Age: 49
LOS: 15 days | End: 2024-04-30
Attending: PSYCHIATRY & NEUROLOGY
Payer: COMMERCIAL

## 2024-04-15 DIAGNOSIS — F17.200: ICD-10-CM

## 2024-04-15 DIAGNOSIS — F11.20: Primary | ICD-10-CM

## 2024-05-16 ENCOUNTER — HOSPITAL ENCOUNTER (OUTPATIENT)
Dept: HOSPITAL 53 - M OUTALCOH | Age: 49
LOS: 15 days | End: 2024-05-31
Attending: PSYCHIATRY & NEUROLOGY
Payer: COMMERCIAL

## 2024-05-16 DIAGNOSIS — F17.200: ICD-10-CM

## 2024-05-16 DIAGNOSIS — F11.20: Primary | ICD-10-CM

## 2024-07-15 ENCOUNTER — HOSPITAL ENCOUNTER (OUTPATIENT)
Dept: HOSPITAL 53 - M OUTALCOH | Age: 49
LOS: 16 days | End: 2024-07-31
Attending: PSYCHIATRY & NEUROLOGY
Payer: COMMERCIAL

## 2024-07-15 DIAGNOSIS — F17.200: ICD-10-CM

## 2024-07-15 DIAGNOSIS — F11.20: Primary | ICD-10-CM

## 2024-09-17 ENCOUNTER — HOSPITAL ENCOUNTER (OUTPATIENT)
Dept: HOSPITAL 53 - M OUTALCOH | Age: 49
LOS: 13 days | End: 2024-09-30
Attending: PSYCHIATRY & NEUROLOGY
Payer: COMMERCIAL

## 2024-09-17 ENCOUNTER — HOSPITAL ENCOUNTER (OUTPATIENT)
Dept: HOSPITAL 53 - M PLALAB | Age: 49
End: 2024-09-17
Attending: NURSE PRACTITIONER
Payer: COMMERCIAL

## 2024-09-17 DIAGNOSIS — F11.20: Primary | ICD-10-CM

## 2024-09-17 DIAGNOSIS — I10: Primary | ICD-10-CM

## 2024-09-17 DIAGNOSIS — F17.200: ICD-10-CM

## 2024-09-17 LAB
ALBUMIN SERPL BCG-MCNC: 3.6 G/DL (ref 3.2–5.2)
ALP SERPL-CCNC: 132 U/L (ref 46–116)
ALT SERPL W P-5'-P-CCNC: 19 U/L (ref 7–40)
AST SERPL-CCNC: 28 U/L (ref ?–34)
BASOPHILS # BLD AUTO: 0.1 10^3/UL (ref 0–0.2)
BASOPHILS NFR BLD AUTO: 1 % (ref 0–1)
BILIRUB SERPL-MCNC: 0.7 MG/DL (ref 0.3–1.2)
BUN SERPL-MCNC: 8 MG/DL (ref 9–23)
CALCIUM SERPL-MCNC: 9.3 MG/DL (ref 8.5–10.1)
CHLORIDE SERPL-SCNC: 108 MMOL/L (ref 98–107)
CHOLEST SERPL-MCNC: 216 MG/DL (ref ?–200)
CHOLEST/HDLC SERPL: 3.64 {RATIO} (ref ?–5)
CO2 SERPL-SCNC: 30 MMOL/L (ref 20–31)
CREAT SERPL-MCNC: 0.7 MG/DL (ref 0.55–1.3)
EOSINOPHIL # BLD AUTO: 0.2 10^3/UL (ref 0–0.5)
EOSINOPHIL NFR BLD AUTO: 3.3 % (ref 0–3)
GFR SERPL CREATININE-BSD FRML MDRD: > 60 ML/MIN/{1.73_M2} (ref 58–?)
GLUCOSE SERPL-MCNC: 90 MG/DL (ref 60–100)
HCT VFR BLD AUTO: 34.1 % (ref 36–47)
HDLC SERPL-MCNC: 59.3 MG/DL (ref 40–?)
HGB BLD-MCNC: 10.2 G/DL (ref 12–15.5)
LDLC SERPL CALC-MCNC: 130.1 MG/DL (ref ?–100)
LYMPHOCYTES # BLD AUTO: 1 10^3/UL (ref 1.5–5)
LYMPHOCYTES NFR BLD AUTO: 21.3 % (ref 24–44)
MAGNESIUM SERPL-MCNC: 2.2 MG/DL (ref 1.8–2.4)
MCH RBC QN AUTO: 25.8 PG (ref 27–33)
MCHC RBC AUTO-ENTMCNC: 29.9 G/DL (ref 32–36.5)
MCV RBC AUTO: 86.1 FL (ref 80–96)
MONOCYTES # BLD AUTO: 0.5 10^3/UL (ref 0–0.8)
MONOCYTES NFR BLD AUTO: 10.4 % (ref 2–8)
NEUTROPHILS # BLD AUTO: 3.1 10^3/UL (ref 1.5–8.5)
NEUTROPHILS NFR BLD AUTO: 63.6 % (ref 36–66)
NONHDLC SERPL-MCNC: 156.7 MG/DL
PLATELET # BLD AUTO: 214 10^3/UL (ref 150–450)
POTASSIUM SERPL-SCNC: 4.5 MMOL/L (ref 3.5–5.1)
PROT SERPL-MCNC: 6.9 G/DL (ref 5.7–8.2)
RBC # BLD AUTO: 3.96 10^6/UL (ref 4–5.4)
SODIUM SERPL-SCNC: 142 MMOL/L (ref 136–145)
T4 FREE SERPL-MCNC: 1.17 NG/DL (ref 0.89–1.76)
TRIGL SERPL-MCNC: 133 MG/DL (ref ?–150)
TSH SERPL DL<=0.005 MIU/L-ACNC: 2.66 UIU/ML (ref 0.55–4.78)
WBC # BLD AUTO: 4.8 10^3/UL (ref 4–10)

## 2024-11-22 ENCOUNTER — HOSPITAL ENCOUNTER (OUTPATIENT)
Dept: HOSPITAL 53 - M OUTALCOH | Age: 49
LOS: 8 days | End: 2024-11-30
Attending: PSYCHIATRY & NEUROLOGY
Payer: COMMERCIAL

## 2024-11-22 DIAGNOSIS — F17.200: ICD-10-CM

## 2024-11-22 DIAGNOSIS — F11.20: Primary | ICD-10-CM

## 2024-12-04 ENCOUNTER — HOSPITAL ENCOUNTER (OUTPATIENT)
Dept: HOSPITAL 53 - M OUTALCOH | Age: 49
LOS: 27 days | End: 2024-12-31
Attending: PSYCHIATRY & NEUROLOGY
Payer: COMMERCIAL

## 2024-12-04 DIAGNOSIS — F17.200: ICD-10-CM

## 2024-12-04 DIAGNOSIS — F11.20: Primary | ICD-10-CM

## 2025-01-20 ENCOUNTER — HOSPITAL ENCOUNTER (OUTPATIENT)
Dept: HOSPITAL 53 - M OUTALCOH | Age: 50
LOS: 11 days | End: 2025-01-31
Attending: PSYCHIATRY & NEUROLOGY
Payer: COMMERCIAL

## 2025-01-20 DIAGNOSIS — F11.20: Primary | ICD-10-CM

## 2025-01-20 DIAGNOSIS — F17.200: ICD-10-CM

## 2025-02-17 ENCOUNTER — HOSPITAL ENCOUNTER (OUTPATIENT)
Dept: HOSPITAL 53 - M OUTALCOH | Age: 50
LOS: 11 days | End: 2025-02-28
Attending: PSYCHIATRY & NEUROLOGY
Payer: COMMERCIAL

## 2025-02-17 DIAGNOSIS — F17.200: ICD-10-CM

## 2025-02-17 DIAGNOSIS — F11.20: Primary | ICD-10-CM

## 2025-03-31 ENCOUNTER — HOSPITAL ENCOUNTER (OUTPATIENT)
Dept: HOSPITAL 53 - M OUTALCOH | Age: 50
End: 2025-03-31
Attending: PSYCHIATRY & NEUROLOGY
Payer: COMMERCIAL

## 2025-03-31 DIAGNOSIS — F11.20: Primary | ICD-10-CM

## 2025-03-31 DIAGNOSIS — F17.200: ICD-10-CM

## 2025-07-22 ENCOUNTER — HOSPITAL ENCOUNTER (OUTPATIENT)
Dept: HOSPITAL 53 - M OUTALCOH | Age: 50
LOS: 9 days | End: 2025-07-31
Attending: PSYCHIATRY & NEUROLOGY
Payer: COMMERCIAL

## 2025-07-22 DIAGNOSIS — F11.20: Primary | ICD-10-CM

## 2025-07-22 DIAGNOSIS — F17.200: ICD-10-CM
